# Patient Record
Sex: MALE | Race: WHITE
[De-identification: names, ages, dates, MRNs, and addresses within clinical notes are randomized per-mention and may not be internally consistent; named-entity substitution may affect disease eponyms.]

---

## 2017-03-27 ENCOUNTER — HOSPITAL ENCOUNTER (INPATIENT)
Dept: HOSPITAL 17 - NEPA | Age: 57
LOS: 5 days | Discharge: HOME HEALTH | DRG: 175 | End: 2017-04-01
Payer: COMMERCIAL

## 2017-03-27 VITALS
SYSTOLIC BLOOD PRESSURE: 105 MMHG | RESPIRATION RATE: 20 BRPM | DIASTOLIC BLOOD PRESSURE: 73 MMHG | OXYGEN SATURATION: 95 % | HEART RATE: 103 BPM

## 2017-03-27 VITALS
HEART RATE: 110 BPM | DIASTOLIC BLOOD PRESSURE: 100 MMHG | OXYGEN SATURATION: 94 % | SYSTOLIC BLOOD PRESSURE: 132 MMHG | RESPIRATION RATE: 28 BRPM

## 2017-03-27 VITALS
OXYGEN SATURATION: 85 % | TEMPERATURE: 98.2 F | RESPIRATION RATE: 22 BRPM | SYSTOLIC BLOOD PRESSURE: 116 MMHG | HEART RATE: 90 BPM | DIASTOLIC BLOOD PRESSURE: 54 MMHG

## 2017-03-27 VITALS
SYSTOLIC BLOOD PRESSURE: 133 MMHG | RESPIRATION RATE: 20 BRPM | HEART RATE: 98 BPM | OXYGEN SATURATION: 95 % | DIASTOLIC BLOOD PRESSURE: 65 MMHG

## 2017-03-27 VITALS
DIASTOLIC BLOOD PRESSURE: 78 MMHG | OXYGEN SATURATION: 95 % | HEART RATE: 98 BPM | RESPIRATION RATE: 20 BRPM | SYSTOLIC BLOOD PRESSURE: 118 MMHG

## 2017-03-27 VITALS — OXYGEN SATURATION: 94 %

## 2017-03-27 VITALS — BODY MASS INDEX: 41.75 KG/M2 | WEIGHT: 315 LBS | HEIGHT: 73 IN

## 2017-03-27 VITALS
OXYGEN SATURATION: 96 % | HEART RATE: 94 BPM | SYSTOLIC BLOOD PRESSURE: 118 MMHG | TEMPERATURE: 97.8 F | DIASTOLIC BLOOD PRESSURE: 67 MMHG | RESPIRATION RATE: 20 BRPM

## 2017-03-27 VITALS — OXYGEN SATURATION: 97 %

## 2017-03-27 VITALS — HEART RATE: 90 BPM

## 2017-03-27 DIAGNOSIS — Z23: ICD-10-CM

## 2017-03-27 DIAGNOSIS — R51: ICD-10-CM

## 2017-03-27 DIAGNOSIS — J96.01: ICD-10-CM

## 2017-03-27 DIAGNOSIS — E66.01: ICD-10-CM

## 2017-03-27 DIAGNOSIS — Z82.49: ICD-10-CM

## 2017-03-27 DIAGNOSIS — E83.51: ICD-10-CM

## 2017-03-27 DIAGNOSIS — I26.99: Primary | ICD-10-CM

## 2017-03-27 DIAGNOSIS — H91.90: ICD-10-CM

## 2017-03-27 DIAGNOSIS — I82.432: ICD-10-CM

## 2017-03-27 LAB
ALP SERPL-CCNC: 98 U/L (ref 45–117)
ALT SERPL-CCNC: 36 U/L (ref 12–78)
ANION GAP SERPL CALC-SCNC: 12 MEQ/L (ref 5–15)
APTT BLD: 28.1 SEC (ref 24.3–30.1)
APTT BLD: 29.6 SEC (ref 24.3–30.1)
APTT BLD: 31 SEC (ref 24.3–30.1)
AST SERPL-CCNC: 35 U/L (ref 15–37)
BASOPHILS # BLD AUTO: 0.1 TH/MM3 (ref 0–0.2)
BASOPHILS # BLD AUTO: 0.2 TH/MM3 (ref 0–0.2)
BASOPHILS NFR BLD: 0.7 % (ref 0–2)
BASOPHILS NFR BLD: 1 % (ref 0–2)
BILIRUB SERPL-MCNC: 0.4 MG/DL (ref 0.2–1)
BUN SERPL-MCNC: 15 MG/DL (ref 7–18)
CHLORIDE SERPL-SCNC: 104 MEQ/L (ref 98–107)
COLOR UR: YELLOW
COMMENT (UR): (no result)
CULTURE IF INDICATED: (no result)
EOSINOPHIL # BLD: 0.1 TH/MM3 (ref 0–0.4)
EOSINOPHIL # BLD: 0.1 TH/MM3 (ref 0–0.4)
EOSINOPHIL NFR BLD: 0.9 % (ref 0–4)
EOSINOPHIL NFR BLD: 0.9 % (ref 0–4)
ERYTHROCYTE [DISTWIDTH] IN BLOOD BY AUTOMATED COUNT: 14.4 % (ref 11.6–17.2)
ERYTHROCYTE [DISTWIDTH] IN BLOOD BY AUTOMATED COUNT: 14.5 % (ref 11.6–17.2)
ERYTHROCYTE [DISTWIDTH] IN BLOOD BY AUTOMATED COUNT: 14.9 % (ref 11.6–17.2)
FIBRINOGEN PPP-MCNC: 427 MG/DL (ref 227–377)
FIBRINOGEN PPP-MCNC: 428 MG/DL (ref 227–377)
GFR SERPLBLD BASED ON 1.73 SQ M-ARVRAT: 61 ML/MIN (ref 89–?)
GLUCOSE UR STRIP-MCNC: (no result) MG/DL
HCO3 BLD-SCNC: 20.2 MEQ/L (ref 21–32)
HCT VFR BLD CALC: 41.4 % (ref 39–51)
HCT VFR BLD CALC: 41.5 % (ref 39–51)
HCT VFR BLD CALC: 43.9 % (ref 39–51)
HEMO FLAGS: (no result)
HEMO FLAGS: (no result)
HGB UR QL STRIP: (no result)
INR PPP: 1.1 RATIO
KETONES UR STRIP-MCNC: (no result) MG/DL
LYMPHOCYTES # BLD AUTO: 2.4 TH/MM3 (ref 1–4.8)
LYMPHOCYTES # BLD AUTO: 3.6 TH/MM3 (ref 1–4.8)
LYMPHOCYTES NFR BLD AUTO: 15.3 % (ref 9–44)
LYMPHOCYTES NFR BLD AUTO: 21.8 % (ref 9–44)
MCH RBC QN AUTO: 28.5 PG (ref 27–34)
MCH RBC QN AUTO: 28.9 PG (ref 27–34)
MCH RBC QN AUTO: 28.9 PG (ref 27–34)
MCHC RBC AUTO-ENTMCNC: 31.9 % (ref 32–36)
MCHC RBC AUTO-ENTMCNC: 32.7 % (ref 32–36)
MCHC RBC AUTO-ENTMCNC: 32.8 % (ref 32–36)
MCV RBC AUTO: 88.1 FL (ref 80–100)
MCV RBC AUTO: 88.2 FL (ref 80–100)
MCV RBC AUTO: 89.2 FL (ref 80–100)
MONOCYTES NFR BLD: 8.7 % (ref 0–8)
MONOCYTES NFR BLD: 9.7 % (ref 0–8)
MUCOUS THREADS #/AREA URNS LPF: (no result) /LPF
NEUTROPHILS # BLD AUTO: 10.9 TH/MM3 (ref 1.8–7.7)
NEUTROPHILS # BLD AUTO: 11.9 TH/MM3 (ref 1.8–7.7)
NEUTROPHILS NFR BLD AUTO: 66.6 % (ref 16–70)
NEUTROPHILS NFR BLD AUTO: 74.4 % (ref 16–70)
NITRITE UR QL STRIP: (no result)
PLATELET # BLD: 320 TH/MM3 (ref 150–450)
PLATELET # BLD: 339 TH/MM3 (ref 150–450)
PLATELET # BLD: 348 TH/MM3 (ref 150–450)
POTASSIUM SERPL-SCNC: 4.2 MEQ/L (ref 3.5–5.1)
PROTHROMBIN TIME: 12.5 SEC (ref 9.8–11.6)
RBC # BLD AUTO: 4.65 MIL/MM3 (ref 4.5–5.9)
RBC # BLD AUTO: 4.7 MIL/MM3 (ref 4.5–5.9)
RBC # BLD AUTO: 4.97 MIL/MM3 (ref 4.5–5.9)
REVIEW FLAG: (no result)
SODIUM SERPL-SCNC: 136 MEQ/L (ref 136–145)
SP GR UR STRIP: 1.03 (ref 1–1.03)
SQUAMOUS #/AREA URNS HPF: <1 /HPF (ref 0–5)
WBC # BLD AUTO: 15.5 TH/MM3 (ref 4–11)
WBC # BLD AUTO: 16 TH/MM3 (ref 4–11)
WBC # BLD AUTO: 16.4 TH/MM3 (ref 4–11)

## 2017-03-27 PROCEDURE — 96375 TX/PRO/DX INJ NEW DRUG ADDON: CPT

## 2017-03-27 PROCEDURE — 83735 ASSAY OF MAGNESIUM: CPT

## 2017-03-27 PROCEDURE — 81001 URINALYSIS AUTO W/SCOPE: CPT

## 2017-03-27 PROCEDURE — 84484 ASSAY OF TROPONIN QUANT: CPT

## 2017-03-27 PROCEDURE — 90732 PPSV23 VACC 2 YRS+ SUBQ/IM: CPT

## 2017-03-27 PROCEDURE — 81241 F5 GENE: CPT

## 2017-03-27 PROCEDURE — 93306 TTE W/DOPPLER COMPLETE: CPT

## 2017-03-27 PROCEDURE — 94620: CPT

## 2017-03-27 PROCEDURE — 76937 US GUIDE VASCULAR ACCESS: CPT

## 2017-03-27 PROCEDURE — 70450 CT HEAD/BRAIN W/O DYE: CPT

## 2017-03-27 PROCEDURE — 85597 PHOSPHOLIPID PLTLT NEUTRALIZ: CPT

## 2017-03-27 PROCEDURE — 3E04317 INTRODUCTION OF OTHER THROMBOLYTIC INTO CENTRAL VEIN, PERCUTANEOUS APPROACH: ICD-10-PCS | Performed by: RADIOLOGY

## 2017-03-27 PROCEDURE — 87641 MR-STAPH DNA AMP PROBE: CPT

## 2017-03-27 PROCEDURE — 37212 THROMBOLYTIC VENOUS THERAPY: CPT

## 2017-03-27 PROCEDURE — 87086 URINE CULTURE/COLONY COUNT: CPT

## 2017-03-27 PROCEDURE — 84100 ASSAY OF PHOSPHORUS: CPT

## 2017-03-27 PROCEDURE — 85598 HEXAGNAL PHOSPH PLTLT NEUTRL: CPT

## 2017-03-27 PROCEDURE — 85025 COMPLETE CBC W/AUTO DIFF WBC: CPT

## 2017-03-27 PROCEDURE — 93970 EXTREMITY STUDY: CPT

## 2017-03-27 PROCEDURE — 36556 INSERT NON-TUNNEL CV CATH: CPT

## 2017-03-27 PROCEDURE — 05HM33Z INSERTION OF INFUSION DEVICE INTO RIGHT INTERNAL JUGULAR VEIN, PERCUTANEOUS APPROACH: ICD-10-PCS | Performed by: RADIOLOGY

## 2017-03-27 PROCEDURE — 93005 ELECTROCARDIOGRAM TRACING: CPT

## 2017-03-27 PROCEDURE — 85730 THROMBOPLASTIN TIME PARTIAL: CPT

## 2017-03-27 PROCEDURE — 83880 ASSAY OF NATRIURETIC PEPTIDE: CPT

## 2017-03-27 PROCEDURE — 85670 THROMBIN TIME PLASMA: CPT

## 2017-03-27 PROCEDURE — B543ZZA ULTRASONOGRAPHY OF RIGHT JUGULAR VEINS, GUIDANCE: ICD-10-PCS | Performed by: RADIOLOGY

## 2017-03-27 PROCEDURE — 85027 COMPLETE CBC AUTOMATED: CPT

## 2017-03-27 PROCEDURE — 80048 BASIC METABOLIC PNL TOTAL CA: CPT

## 2017-03-27 PROCEDURE — 85610 PROTHROMBIN TIME: CPT

## 2017-03-27 PROCEDURE — 83090 ASSAY OF HOMOCYSTEINE: CPT

## 2017-03-27 PROCEDURE — C9113 INJ PANTOPRAZOLE SODIUM, VIA: HCPCS

## 2017-03-27 PROCEDURE — 85613 RUSSELL VIPER VENOM DILUTED: CPT

## 2017-03-27 PROCEDURE — 85303 CLOT INHIBIT PROT C ACTIVITY: CPT

## 2017-03-27 PROCEDURE — 85300 ANTITHROMBIN III ACTIVITY: CPT

## 2017-03-27 PROCEDURE — 71275 CT ANGIOGRAPHY CHEST: CPT

## 2017-03-27 PROCEDURE — 82948 REAGENT STRIP/BLOOD GLUCOSE: CPT

## 2017-03-27 PROCEDURE — 85306 CLOT INHIBIT PROT S FREE: CPT

## 2017-03-27 PROCEDURE — 80053 COMPREHEN METABOLIC PANEL: CPT

## 2017-03-27 PROCEDURE — 71010: CPT

## 2017-03-27 PROCEDURE — 94640 AIRWAY INHALATION TREATMENT: CPT

## 2017-03-27 PROCEDURE — 96374 THER/PROPH/DIAG INJ IV PUSH: CPT

## 2017-03-27 PROCEDURE — 85384 FIBRINOGEN ACTIVITY: CPT

## 2017-03-27 RX ADMIN — PANTOPRAZOLE SODIUM SCH MG: 40 INJECTION, POWDER, FOR SOLUTION INTRAVENOUS at 16:00

## 2017-03-27 RX ADMIN — IPRATROPIUM BROMIDE AND ALBUTEROL SULFATE SCH AMPULE: .5; 3 SOLUTION RESPIRATORY (INHALATION) at 18:09

## 2017-03-27 RX ADMIN — IPRATROPIUM BROMIDE AND ALBUTEROL SULFATE SCH AMPULE: .5; 3 SOLUTION RESPIRATORY (INHALATION) at 19:40

## 2017-03-27 RX ADMIN — HUMAN INSULIN SCH: 100 INJECTION, SOLUTION SUBCUTANEOUS at 23:00

## 2017-03-27 RX ADMIN — IPRATROPIUM BROMIDE AND ALBUTEROL SULFATE SCH AMPULE: .5; 3 SOLUTION RESPIRATORY (INHALATION) at 23:41

## 2017-03-27 RX ADMIN — PHENYTOIN SODIUM SCH MLS/HR: 50 INJECTION INTRAMUSCULAR; INTRAVENOUS at 19:07

## 2017-03-27 NOTE — MH
cc:

TABATHA TAM M.D.

****

 

DATE OF ADMISSION

3/27/2017

 

DATE OF BIRTH

December 15, 1960

 

HISTORY OF THE PRESENT ILLNESS

The patient is a 56-year-old male with past medical history of morbid obesity

who presented to [facility] ED with a one week history of

left-sided chest pain that radiates into his flank associated with shortness of

breath for 4 days.  Last week he was diagnosed with pneumonia by his PCP and

started on Levaquin which he has been taking for the past 5 days. He denies any

symptoms of edema of lower extremity or PND.  Also the patient denies any

nausea, vomiting or abdominal pain.  On arrival to the ER he was tachycardiac

and hypoxemic with O2 saturation of 85%.  The patient was placed on 5 liters

nasal cannula with saturation improved to 95%.  Chest x-ray in the ER showed

cardiomegaly with subsegmental atelectasis in the left lung base and small left

pleural effusion.  Due to his respiratory distress CT angiogram of the chest

was obtained which showed massive bilateral PE, right greater than left with

subpleural atelectatic changes in the left lower lobe.

 

His laboratory data significant for Troponin of 0.56 and elevated BNP of 421.

In the ER the patient was given heparin bolus and just started on heparin drip.

Case discussed with Dr. Parrish  from interventional radiology, and plan to

proceed with t-PA infusion at 2 mg an hour and heparin drip 500 units per hour.

The patient denies prior history of pulmonary embolism or DVT.  However, he

states that his brother had a DVT in the past.

 

PAST MEDICAL HISTORY

Significant for:

Morbid obesity.

 

The patient denies any history of hypertension, hyperlipidemia, diabetes

mellitus.

 

PAST SURGICAL HISTORY

Unremarkable.

 

ALLERGIES

NO KNOWN DRUG ALLERGIES.

 

MEDICATIONS

At home, none.

 

SOCIAL HISTORY

Remote history of tobacco and EtOH use.

 

FAMILY HISTORY

A brother had DVT in the past.

 

REVIEW OF SYSTEMS

As per HPI.  The rest of the review of systems unremarkable.

 

PHYSICAL EXAMINATION

GENERAL: A 56-year-old male lying in bed in mild respiratory distress.

VITAL SIGNS: Temperature 98.3, pulse of 103, blood pressure 105/73, saturation

of 95% on 5 liters oxygen.

HEENT:  Atraumatic, normocephalic. Pupils equal, round and reactive to light

and accommodation. Extraocular muscles intact.  Conjunctivae pink.  Nonicteric

sclerae.  Oral mucosa within normal.

NECK: Supple.  No JVD, adenopathy or thyromegaly.  Trachea midline.

CARDIOVASCULAR: Tachycardiac. Normal S1-S2.  No murmurs, rubs or gallops noted.

 

LUNGS: Pulmonary exam bilateral equal air entry.  No crackles.  ABDOMEN: Soft,

obese, nontender, no distension.  Positive bowel sounds.

EXTREMITIES: No cyanosis, clubbing. Trace edema.

NEUROLOGIC:  No focal sensory deficit.  Awake, alert, oriented x3.

 

LABORATORY DATA

WBC 15.5, hemoglobin 13.6, hematocrit 41, platelet count 339.

 

Sodium 136, potassium 4.2, chloride 104, CO2 of 20, BUN 15, creatinine 1.23,

glucose 120.

 

Troponin 0.56.

 

.

 

INR 1.1, PT 12.5, PTT 28.1.

 

RADIOGRAPHY

CT angiogram of the chest showed massive bilateral PE, right greater than left

and sub total atelectatic changes in the left lower lobe.

 

IMPRESSION

1. Acute hypoxemic respiratory failure.

2. Massive bilateral pulmonary embolism.

3. Morbid obesity.

4. Leukocytosis.

 

RECOMMENDATIONS

1. Monitor neuro status and avoid any sedatives.

2. Continue with oxygen and maintain sats above 92%.

3. Bronchodilators in the form of DuoNeb q. 4q. Plus q.2h as needed for

   shortness of breath.

4. Case discussed with Dr. Parrish from interventional radiology. We will

   proceed with t-PA infusion at 2 mg an hour in addition to heparin drip 500

   units an hour.

5. Given the massive bilateral PE with a large clot burden,  hypoxemia,

   elevated troponin and BNP and likely right heart strain, the patient is a

   candidate for thrombolysis.

6. Monitor heart rate and blood pressure closely and maintain MAP greater than

   65 mmHg.

7. Monitor troponins and we will obtain 2-D echo to evaluate LV function and

   check for right-sided heart strain.

8. Monitor renal function Is and Os and electrolyte replacement as needed.

9. Place on Protonix 40 mg IV daily for GI prophylaxis.

10. IV fluids in the form of NS at 70 ml an hour.

11. Monitor for signs of infections which include fever and WBC.  We will check

   urinalysis with culture if indicated.  Will panculture spikes in fever.

12. Sliding scale insulin with Accu-Cheks if needed for glycemic control.

13. Monitor CBC and coags as the patient will be on t-PA and heparin.

     Check hypercoagulable profile. Hematology evaluation

14. GI prophylaxis with Protonix 40 mg daily and DVT prophylaxis.  The patient

   will be will be receiving t-PA infusion for PE.

     Check Doppler US LE ro/o DVT.

 

Critical care time 35 minutes.

 

The case discussed with ED physician and Dr. Parrish from interventional

radiology.

 

 

 

                               __________________________________

                           MD ARCHANA Cleveland/DEMETRIS

D:  3/27/2017/4:07 PM

T:  3/27/2017/4:45 PM

Visit #:  [id number]

Job #:  [id number]

MTDD

## 2017-03-27 NOTE — RADRPT
EXAM DATE/TIME:  03/27/2017 17:16 

 

[facility] COMPARISON:     

CHEST SINGLE AP, March 27, 2017, 13:06.

 

                     

INDICATIONS :     

Central line placement.

                     

 

MEDICAL HISTORY :     

None.          

 

SURGICAL HISTORY :     

None.   

 

ENCOUNTER:     

Initial                                        

 

ACUITY:     

1 day      

 

PAIN SCORE:     

Non-responsive.

 

LOCATION:     

Bilateral chest 

 

FINDINGS:     

A single view of the chest demonstrates the lungs to be symmetrically aerated without evidence of mas
s, infiltrate or effusion.  Please note the lung bases were not included on this exam as it was perfo
rmed for central line position confirmation.  Osseous structures are intact. Right IJ central venous 
catheter with the tip projecting over the central venous system

 

CONCLUSION:     

Right IJ central venous catheter with the tip projecting over the central venous system.

 

 

 

 Urban Parrish MD on March 27, 2017 at 17:40           

Board Certified Radiologist.

 This report was verified electronically.

## 2017-03-27 NOTE — PD
HPI


Chief Complaint:  Respiratory Distress


Time Seen by Provider:  13:03


Travel History


International Travel<30 days:  No


Contact w/Intl Traveler<30days:  No


Traveled to known affect area:  No





History of Present Illness


HPI


This is a 56-year-old male with a history of morbid obesity who presents the 

emergency department with 1 week of left-sided chest pain that goes into his 

flank, worse with deep breaths and worse with coughing, associated with 

shortness of breath particularly on exertion.  He's had some nonproductive 

cough and some rhinorrhea.  On Wednesday he was diagnosed with pneumonia on 

chest x-ray by his primary care physician and started on Levaquin which he's 

been taking for 5 days.  He was supposed to go to an outpatient CT scan today 

but because his shortness of breath has been worsening his primary care 

physician told him to come to the emergency department.  He's never had 

symptoms like this before.  He was recently admitted to the hospital several 

weeks ago for edema.  He was given diuresis and found to be hypocalcemic.  

Ultimately they didn't find a diagnosis of congestive heart failure.





PFSH


Past Medical History


Medical other:  Yes (OBESITY)


Tetanus Vaccination:  Unknown


Influenza Vaccination:  No





Past Surgical History


Surgical History:  No Previous Surgery





Social History


Alcohol Use:  No


Tobacco Use:  No


Substance Use:  No





Allergies-Medications


(Allergen,Severity, Reaction):  


Coded Allergies:  


     No Known Allergies (Unverified , 3/27/17)


Reported Meds & Prescriptions








Reported Meds & Active Scripts


Active


Reported


Levaquin (Levofloxacin) 750 Mg Tab 750 Mg PO DAILY 5 Days


Contrave (Naltrexone-Bupropion) 8-90 Mg Tab 2 Tab PO BID











Review of Systems


Except as stated in HPI:  all other systems reviewed are Neg





Physical Exam


Narrative


GENERAL:Morbidly obese


SKIN: Warm and dry.


HEAD: Atraumatic. Normocephalic. 


EYES: Pupils equal and round.  No injection or drainage. 


ENT:  Moist mucous membranes


NECK: Trachea midline. 


CARDIOVASCULAR: Regular rate and rhythm.  No murmur appreciated.


RESPIRATORY: Clear to auscultation. Breath sounds equal bilaterally. Exam 

limited by habius


GASTROINTESTINAL: Abdomen soft, non-tender, nondistended. 


MUSCULOSKELETAL: No obvious deformities. 


NEUROLOGICAL: Awake and alert. No obvious cranial nerve deficits.   Moving all 

extremities


PSYCHIATRIC: Appropriate mood and affect; insight and judgment normal.





Data


Data


Last Documented VS





Vital Signs








  Date Time  Temp Pulse Resp B/P Pulse Ox O2 Delivery O2 Flow Rate FiO2


 


3/27/17 15:00  103 20 105/73 95 Nasal Cannula 5 


 


3/27/17 12:43 98.2       








Orders





 Complete Blood Count With Diff (3/27/17 13:03)


Comprehensive Metabolic Panel (3/27/17 13:03)


B-Type Natriuretic Peptide (3/27/17 13:03)


Troponin I (3/27/17 13:03)


Iv Access Insert/Monitor (3/27/17 13:03)


Electrocardiogram (3/27/17 13:03)


Ecg Monitoring (3/27/17 13:03)


Oximetry (3/27/17 13:03)


Oxygen Administration (3/27/17 13:03)


Chest, Single Ap (3/27/17 13:03)


Ct Pulmonary Angiogram (3/27/17 13:03)


Iohexol 350 Inj (Omnipaque 350 Inj) (3/27/17 13:59)


Heparin Infusion [place].Q1H (3/27/17 14:07)


Heparin Inj (Heparin Inj) (3/27/17 14:15)


Heparin Inj (Heparin Inj) (3/27/17 20:15)


Heparin Inj (Heparin Inj) (3/27/17 20:15)


Heparin-D5w Inj (Heparin-D5w Inj) (3/27/17 14:15)


Act Partial Throm Time (Ptt) (3/27/17 14:07)


Prothrombin Time / Inr (Pt) (3/27/17 14:07)


Cbc No Diff, Includes Plts (3/27/17 14:07)


Cbc No Diff, Includes Plts (3/30/17 06:00)


Act Partial Throm Time (Ptt) (3/27/17 21:07)


Occult Blood (Hemoccult) Stool (3/27/17 14:07)


Admit Order (Ed Use Only) (3/27/17 15:27)





Labs








 Laboratory Tests








Test 3/27/17 3/27/17





 13:30 14:20


 


White Blood Count 16.0 TH/MM3 15.5 TH/MM3


 


Red Blood Count 4.97 MIL/MM3 4.70 MIL/MM3


 


Hemoglobin 14.4 GM/DL 13.6 GM/DL


 


Hematocrit 43.9 % 41.4 %


 


Mean Corpuscular Volume 88.2 FL 88.1 FL


 


Mean Corpuscular Hemoglobin 28.9 PG 28.9 PG


 


Mean Corpuscular Hemoglobin 32.7 % 32.8 %





Concent  


 


Red Cell Distribution Width 14.5 % 14.9 %


 


Platelet Count 348 TH/MM3 339 TH/MM3


 


Mean Platelet Volume 7.6 FL 7.6 FL


 


Neutrophils (%) (Auto) 74.4 % 


 


Lymphocytes (%) (Auto) 15.3 % 


 


Monocytes (%) (Auto) 8.7 % 


 


Eosinophils (%) (Auto) 0.9 % 


 


Basophils (%) (Auto) 0.7 % 


 


Neutrophils # (Auto) 11.9 TH/MM3 


 


Lymphocytes # (Auto) 2.4 TH/MM3 


 


Monocytes # (Auto) 1.4 TH/MM3 


 


Eosinophils # (Auto) 0.1 TH/MM3 


 


Basophils # (Auto) 0.1 TH/MM3 


 


CBC Comment DIFF FINAL  


 


Differential Comment   


 


Sodium Level 136 MEQ/L 


 


Potassium Level 4.2 MEQ/L 


 


Chloride Level 104 MEQ/L 


 


Carbon Dioxide Level 20.2 MEQ/L 


 


Anion Gap 12 MEQ/L 


 


Blood Urea Nitrogen 15 MG/DL 


 


Creatinine 1.23 MG/DL 


 


Estimat Glomerular Filtration 61 ML/MIN 





Rate  


 


Random Glucose 120 MG/DL 


 


Calcium Level 9.0 MG/DL 


 


Total Bilirubin 0.4 MG/DL 


 


Aspartate Amino Transf 35 U/L 





(AST/SGOT)  


 


Alanine Aminotransferase 36 U/L 





(ALT/SGPT)  


 


Alkaline Phosphatase 98 U/L 


 


Troponin I 0.56 NG/ML 


 


B-Type Natriuretic Peptide 421 PG/ML 


 


Total Protein 8.3 GM/DL 


 


Albumin 2.9 GM/DL 


 


Prothrombin Time  12.5 SEC


 


Prothromb Time International  1.1 RATIO





Ratio  


 


Activated Partial  28.1 SEC





Thromboplast Time  














MDM


Medical Decision Making


Medical Screen Exam Complete:  Yes


Emergency Medical Condition:  Yes


Interpretation(s)


Afebrile tachycardia, normotensive, hypoxic


Leukocytosis


Troponin is 0.56


BNP is 421





Last 24 hours Impressions








Chest X-Ray 3/27/17 1303 Signed





Impressions: 





 Service Date/Time:  Monday, March 27, 2017 13:06 - CONCLUSION:  1. 

Cardiomegaly 





 2. There is subsegmental atelectasis in the left base. Small left effusion     





 Keegan Carlton MD 


 


CT Angiography 3/27/17 1305 Signed





Impressions: 





 Service Date/Time:  Monday, March 27, 2017 13:46 - CONCLUSION:  1. Massive 





 bilateral PE, right greater than left. 2. Subpleural atelectatic changes in 

the 





 left lower lobe and atelectatic changes paralleling the left major fissure, 





 likely related to the large clot burden in the pulmonary artery. 3. Results 

were 





 called to Dr. Watson in the ED at the time of this dictation     Urban Parrish MD 


 


Lower Extremity Ultrasound 3/27/17 0000 Signed





Impressions: 





 Service Date/Time:  Monday, March 27, 2017 15:42 - CONCLUSION: Nonocclusive 





 thrombus left popliteal vein     Ever Leal MD  FACR








Differential Diagnosis


Pulmonary embolism, pneumonia, acute coronary syndrome, congestive heart failure


Narrative Course


This is a 56-year-old male who presents to the emergency department with chest 

discomfort and shortness of breath.  He is tachycardic and hypoxic on arrival.  

He is placed on a monitor and an IV was established.  Labs are obtained which 

demonstrated troponin of 0.56 and a BNP of 421.  CT demonstrates massive 

pulmonary emboli.  I spoke to interventional radiology and the intensivist.  

Plan for central line placement with TPA infusion.  Patient will be admitted.


Critical Care Narrative


Aggregate critical care time was 45 minutes. Time to perform other separately 

billable procedures was not included in the critical care time. My time did not 

include minutes spent treating any other patients simultaneously or on 

activities that did not directly contribute to the patient's treatment.  





The services I provided to this patient were to treat and/or prevent clinically 

significant deterioration that could result in: Disability, death





I provided critical care services requiring my management, as noted below:


Chart data review, documentation time, medication orders and management, vital 

sign assessments/reviewing monitor data, ordering and reviewing lab tests, 

ordering and interpreting/reviewing x-rays and diagnostic studies, care of the 

patient and discussion of the patient with the admitting physicians.





Physician Communication


Physician Communication


Discussed with Dr. Parrish and Dr. Villarreal





Diagnosis





 Primary Impression:  


 Pulmonary embolus


 Qualified Code:  I26.09 - Other acute pulmonary embolism with acute cor 

pulmonale





Admitting Information


Admitting Physician Requests:  Admit








Lucinda Watson MD Mar 27, 2017 13:30

## 2017-03-27 NOTE — RADRPT
EXAM DATE/TIME:  03/27/2017 13:06 

 

[facility] COMPARISON:     

No previous studies available for comparison.

 

                     

INDICATIONS :     

Breathing difficulty.

                     

 

MEDICAL HISTORY :            

Pneumonia.   

 

SURGICAL HISTORY :     

None.   

 

ENCOUNTER:     

Initial                                        

 

ACUITY:     

3 days      

 

PAIN SCORE:     

0/10

 

LOCATION:     

Bilateral chest 

 

FINDINGS:     

The cardiac silhouette is enlarged in transverse diameter. There is subsegmental atelectasis in the l
eft base. A small left sided effusion is present. The right lung is free of acute parenchymal opacity
.

 

CONCLUSION:     

1. Cardiomegaly

2. There is subsegmental atelectasis in the left base. Small left effusion

 

 

 

 Keegan Carlton MD on March 27, 2017 at 13:27           

Board Certified Radiologist.

 This report was verified electronically.

## 2017-03-27 NOTE — PD.RAD
Post Procedure Progress Note


Pre Procedure Diagnosis:  


(1) Pulmonary embolus


Post Procedure Diagnosis:  


(1) Pulmonary embolus


Procedure Date:


Mar 27, 2017


Supervising Radiologist:


Urban Parrish


Proceduralist/Assist:  Kalpesh Burns RT(R), RT Johnny(R)(CV)


Anesthesia:  Local


Plan of Activity


Patient to Unit:  Other (ED)


Patient Condition:  Fair


See PACS Report for procedural detail/treatment





Central Venous Access Device


Procedure 1


Right


Internal Jugular


Central Line


Placement


triple lumen


Hungarian:  7


PICC Line Length (cm):  16


Findings:


TPA initiated through central port of CVL at 2 mg/hr








Urban Parrish MD Mar 27, 2017 17:40

## 2017-03-27 NOTE — RADRPT
EXAM DATE/TIME:  03/27/2017 13:46 

 

[facility] COMPARISON:     

No previous studies available for comparison.

 

 

INDICATIONS :     

Left sided chest pain and shortness of breath for five days.

                      

 

IV CONTRAST:     

74 cc Omnipaque 350 (iohexol) IV 

 

 

RADIATION DOSE:     

27.65 CTDIvol (mGy) 

 

 

MEDICAL HISTORY :     

None  

 

SURGICAL HISTORY :      

None. 

 

ENCOUNTER:      

Initial

 

ACUITY:      

4 - 6 days

 

PAIN SCALE:      

5/10

 

LOCATION:       

Left chest 

 

TECHNIQUE:     

Volumetric scanning of the chest was performed using a pulmonary embolism protocol MIP images were re
constructed.  Using automated exposure control and adjustment of the mA and/or kV according to patien
t size, radiation dose was kept as low as reasonably achievable to obtain optimal diagnostic quality 
images. 

 

FINDINGS:     

 

PULMONARY ARTERIES: 

Massive bilateral pulmonary embolus, right greater than left.

 

LUNGS:     

Some pleural based consolidation in the left lower lobe. Additional area of atelectasis paralleling t
he major fissure in the superior segment of the left lower.

 

PLEURAE:     

Pleural thickening in the left base likely representing atelectasis mentioned above.

 

MEDIASTINUM:     

There is good visualization of the great vessels of the middle mediastinum.  No evidence of mediastin
al or hilar adenopathy/mass.

 

MUSCULOSKELETAL:     

Within normal limits for patient age.

 

MISCELLANEOUS:     

The visualized upper abdominal organs demonstrate no acute abnormality.

 

CONCLUSION:     

1. Massive bilateral PE, right greater than left.

2. Subpleural atelectatic changes in the left lower lobe and atelectatic changes paralleling the left
 major fissure, likely related to the large clot burden in the pulmonary artery.

3. Results were called to Dr. Watson in the ED at the time of this dictation

 

 

 

 Urban Parrish MD on March 27, 2017 at 14:43           

Board Certified Radiologist.

 This report was verified electronically.

## 2017-03-27 NOTE — MB
cc:

WOLF GARZA M.D., G. FREDERICK M.D. ISKANDAR, ALAA M.D.

****

 

 

DATE OF CONSULTATION

3/27/2017

 

REASON FOR CONSULTATION

Consult requested by Dr. Villarreal for evaluation of massive bilateral pulmonary

embolism.

 

HISTORY OF PRESENT ILLNESS

Steve is a pleasant 56-year-old male.  He has a history of morbid obesity and 
hard of hearing.  

The patient about a week ago had developed shortness of breath.  He was treated

with antibiotics by his primary physician, Dr. Cast. His symptoms did not

improve.  The patient lives in [place]. His primary physician advised him to

come to the emergency room at [facility] due to progressive shortness of breath.



In the emergency room the patient was found to be hypoxic. His O2 saturation was

85%.  He was placed on oxygen and his O2 saturation improved to 94%.  For

further evaluation of the hypoxia a CT angiogram of the chest was done which

shows massive bilateral pulmonary embolism, right greater than left.  He has

subpleural atelectatic changes in the left lower lobe,  atelectatic changes

paralleling the left major fissure likely related to the large clot burden in

the pulmonary artery.  The patient has been admitted by the intensivist, Dr. Villarreal.  Dr. Villarreal has discussed the case with interventional radiologist,

Dr. Parrish.  TPA was recommended due to massive burden of the clot and right

heart strain.  I have been asked to see the patient for further evaluation.

 

The patient is seen in the Intensive Care Unit with his daughter and son at the

bedside.  The patient is short of breath and has  pleuritic chest pains.  He

is being closely monitored in the Intensive Care Unit.  He denies any swelling

of both lower legs.  He never had a blood clot before.  He has a family history

of DVT.  One of his brother has DVT in his legs.  The rest of the review of

systems is negative.

 

PAST MEDICAL HISTORY

Morbid obesity.

Hard of hearing.

 

PAST SURGICAL HISTORY

None.

 

ALLERGIES

None.

 

MEDICATIONS

1. Antibiotic.

2. Contrave to lose weight for the past six weeks.

 

FAMILY HISTORY

Father  from lung cancer.  Mother is alive with lung cancer.  The patient

has two brothers, one has had DVT of lower legs.  The patient does not have any

sisters.  He has one son and two daughters and one step daughter, all are alive

and well.

 

SOCIAL HISTORY

The patient is  and lives with his wife.  He does not smoke cigarettes,

does not drink alcohol. He works, but he does not do any physical work, most of

the time he is just sitting at the desk.

 

PHYSICAL EXAMINATION

GENERAL: This is a morbidly obese white male in mild respiratory distress.

VITAL SIGNS: Heart rate is 98, blood pressure 118/78, O2 saturation 98%.

HEENT:  PERRLA, EOMI, anicteric.  No oral lesions are noted.  NECK: Supple

LYMPHATICS: There is no cervical, supraclavicular, axillary lymphadenopathy

noted.

LUNGS: Clear.  No wheezing, rhonchi or rales.

CARDIOVASCULAR: Heart is regular rate and rhythm.

ABDOMEN: Unable to feel for the liver and spleen or any masses due to morbid

obesity.

EXTREMITIES: No pedal edema.

NEUROLOGIC:  Awake, alert, oriented times three.

SKIN: No significant lesions noted.

 

ASSESSMENT

1. Massive bilateral pulmonary embolism most likely due to sedentary lifestyle

     due to morbid obesity.

2. Morbid obesity.

3. Family history of DVT of the leg in one of his brother.

4. Hard of hearing, wears hearing aids.

 

PLAN

I have reviewed his available records and I have discussed with the patient,

daughter and son at the bedside regarding  the pulmonary embolism.

The patient has a very high clot burden.  He was started on TPA and then he

will be on heparin.  We discussed the oral anticoagulant such as Coumadin or

new oral anticoagulant such as the Xarelto or Eliquis.  



The patient does not have any swelling of lower legs, but I will obtain Doppler 

ultrasound of both lower legs to evaluate for any DVTs.  The patient has morbid 
obesity.  

He is sedentary due to the overweight. Although he works, but there is no 
physical

work. 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 He has family history of DVT

as one of his brothers has DVT of the lower legs.  Further details of that are

not available.  Hypercoagulable panel has been ordered by intensivist already.

The patient needs to be on oral anticoagulant for at least a year or could be

more depending on when we get the hypercoagulable panel results.

 

Further recommendations to follow.

 

Thank you for asking my opinion.

 

 

 

                              _________________________________

                              MD ROCIO Mckay/DEMETRIS

D:  3/27/2017/8:03 PM

T:  3/27/2017/9:16 PM

Visit #:  [id number]

Job #:  [id number]

MTDARIANA

## 2017-03-27 NOTE — RADRPT
EXAM DATE/TIME:  03/27/2017 15:42 

 

[facility] COMPARISON:     

No previous studies available for comparison.

        

 

 

INDICATIONS :                

Bilateral leg pain. 

            

 

MEDICAL HISTORY :        

Obesity. Shortness of breath. 

 

SURGICAL HISTORY :     

None. 

 

ENCOUNTER:     

Initial

 

ACUITY:     

1 day

 

PAIN SCORE:      

0/10

 

LOCATION:      

Bilateral  legs. 

                       

 

TECHNIQUE:     

Venous ultrasound of the left and right leg was performed from the inguinal ligament to the proximal 
calf.  Real-time, color Doppler and spectral tracing, compression and augmentation techniques were us
ed.  

 

FINDINGS:     

 

RIGHT LEG:     

There is normal compressibility of the deep venous system from the inguinal region to the proximal ca
lf.  No echogenic clot is seen in the lumen of the common femoral, femoral, popliteal, and posterior 
tibial veins.  There is a normal response of the venous system to proximal and distal augmentation an
d respiration.  

 

LEFT LEG:     

There is nonocclusive thrombus in the left popliteal vein extending above the knee.

 

 

CONCLUSION:     Nonocclusive thrombus left popliteal vein 

 

 

 Ever Leal MD FACR on March 27, 2017 at 16:20           

Board Certified Radiologist.

 This report was verified electronically.

## 2017-03-28 VITALS
DIASTOLIC BLOOD PRESSURE: 61 MMHG | OXYGEN SATURATION: 94 % | RESPIRATION RATE: 20 BRPM | TEMPERATURE: 97.9 F | HEART RATE: 88 BPM | SYSTOLIC BLOOD PRESSURE: 128 MMHG

## 2017-03-28 VITALS
OXYGEN SATURATION: 95 % | TEMPERATURE: 97.8 F | HEART RATE: 83 BPM | RESPIRATION RATE: 20 BRPM | SYSTOLIC BLOOD PRESSURE: 118 MMHG | DIASTOLIC BLOOD PRESSURE: 70 MMHG

## 2017-03-28 VITALS
SYSTOLIC BLOOD PRESSURE: 162 MMHG | HEART RATE: 83 BPM | DIASTOLIC BLOOD PRESSURE: 86 MMHG | TEMPERATURE: 98 F | OXYGEN SATURATION: 94 % | RESPIRATION RATE: 20 BRPM

## 2017-03-28 VITALS
TEMPERATURE: 98.6 F | RESPIRATION RATE: 20 BRPM | SYSTOLIC BLOOD PRESSURE: 120 MMHG | HEART RATE: 95 BPM | DIASTOLIC BLOOD PRESSURE: 73 MMHG | OXYGEN SATURATION: 96 %

## 2017-03-28 VITALS — HEART RATE: 84 BPM

## 2017-03-28 VITALS
DIASTOLIC BLOOD PRESSURE: 70 MMHG | OXYGEN SATURATION: 95 % | TEMPERATURE: 98.6 F | RESPIRATION RATE: 20 BRPM | SYSTOLIC BLOOD PRESSURE: 134 MMHG | HEART RATE: 88 BPM

## 2017-03-28 VITALS — HEART RATE: 80 BPM

## 2017-03-28 VITALS
OXYGEN SATURATION: 95 % | SYSTOLIC BLOOD PRESSURE: 131 MMHG | HEART RATE: 80 BPM | DIASTOLIC BLOOD PRESSURE: 70 MMHG | TEMPERATURE: 98.7 F | RESPIRATION RATE: 20 BRPM

## 2017-03-28 VITALS — OXYGEN SATURATION: 95 %

## 2017-03-28 VITALS — HEART RATE: 85 BPM

## 2017-03-28 VITALS — OXYGEN SATURATION: 96 %

## 2017-03-28 LAB
ANION GAP SERPL CALC-SCNC: 8 MEQ/L (ref 5–15)
APTT BLD: 27.8 SEC (ref 24.3–30.1)
APTT BLD: 28.5 SEC (ref 24.3–30.1)
APTT BLD: 28.6 SEC (ref 24.3–30.1)
APTT BLD: 31.5 SEC (ref 24.3–30.1)
BASOPHILS # BLD AUTO: 0.1 TH/MM3 (ref 0–0.2)
BASOPHILS NFR BLD: 0.7 % (ref 0–2)
BASOPHILS NFR BLD: 1 % (ref 0–2)
BASOPHILS NFR BLD: 1 % (ref 0–2)
BUN SERPL-MCNC: 16 MG/DL (ref 7–18)
CHLORIDE SERPL-SCNC: 109 MEQ/L (ref 98–107)
EOSINOPHIL # BLD: 0.2 TH/MM3 (ref 0–0.4)
EOSINOPHIL NFR BLD: 1 % (ref 0–4)
EOSINOPHIL NFR BLD: 1.4 % (ref 0–4)
EOSINOPHIL NFR BLD: 1.4 % (ref 0–4)
ERYTHROCYTE [DISTWIDTH] IN BLOOD BY AUTOMATED COUNT: 14.3 % (ref 11.6–17.2)
ERYTHROCYTE [DISTWIDTH] IN BLOOD BY AUTOMATED COUNT: 14.7 % (ref 11.6–17.2)
ERYTHROCYTE [DISTWIDTH] IN BLOOD BY AUTOMATED COUNT: 14.9 % (ref 11.6–17.2)
FIBRINOGEN PPP-MCNC: 408 MG/DL (ref 181–393)
FIBRINOGEN PPP-MCNC: 415 MG/DL (ref 227–377)
GFR SERPLBLD BASED ON 1.73 SQ M-ARVRAT: 68 ML/MIN (ref 89–?)
HCO3 BLD-SCNC: 23.9 MEQ/L (ref 21–32)
HCT VFR BLD CALC: 38.7 % (ref 39–51)
HCT VFR BLD CALC: 38.9 % (ref 39–51)
HCT VFR BLD CALC: 40.2 % (ref 39–51)
HEMO FLAGS: (no result)
LYMPHOCYTES # BLD AUTO: 2.7 TH/MM3 (ref 1–4.8)
LYMPHOCYTES # BLD AUTO: 3.1 TH/MM3 (ref 1–4.8)
LYMPHOCYTES # BLD AUTO: 4.8 TH/MM3 (ref 1–4.8)
LYMPHOCYTES NFR BLD AUTO: 18.2 % (ref 9–44)
LYMPHOCYTES NFR BLD AUTO: 21.9 % (ref 9–44)
LYMPHOCYTES NFR BLD AUTO: 27 % (ref 9–44)
MAGNESIUM SERPL-MCNC: 2.3 MG/DL (ref 1.5–2.5)
MCH RBC QN AUTO: 28.7 PG (ref 27–34)
MCH RBC QN AUTO: 29 PG (ref 27–34)
MCH RBC QN AUTO: 29.3 PG (ref 27–34)
MCHC RBC AUTO-ENTMCNC: 32.1 % (ref 32–36)
MCHC RBC AUTO-ENTMCNC: 32.7 % (ref 32–36)
MCHC RBC AUTO-ENTMCNC: 33 % (ref 32–36)
MCV RBC AUTO: 88.8 FL (ref 80–100)
MCV RBC AUTO: 88.9 FL (ref 80–100)
MCV RBC AUTO: 89.4 FL (ref 80–100)
MONOCYTES NFR BLD: 11 % (ref 0–8)
MONOCYTES NFR BLD: 11.2 % (ref 0–8)
MONOCYTES NFR BLD: 12.8 % (ref 0–8)
NEUTROPHILS # BLD AUTO: 10.7 TH/MM3 (ref 1.8–7.7)
NEUTROPHILS # BLD AUTO: 9.1 TH/MM3 (ref 1.8–7.7)
NEUTROPHILS # BLD AUTO: 9.8 TH/MM3 (ref 1.8–7.7)
NEUTROPHILS NFR BLD AUTO: 60.3 % (ref 16–70)
NEUTROPHILS NFR BLD AUTO: 64.5 % (ref 16–70)
NEUTROPHILS NFR BLD AUTO: 66.6 % (ref 16–70)
PLATELET # BLD: 247 TH/MM3 (ref 150–450)
PLATELET # BLD: 261 TH/MM3 (ref 150–450)
PLATELET # BLD: 308 TH/MM3 (ref 150–450)
POTASSIUM SERPL-SCNC: 3.9 MEQ/L (ref 3.5–5.1)
RBC # BLD AUTO: 4.35 MIL/MM3 (ref 4.5–5.9)
RBC # BLD AUTO: 4.36 MIL/MM3 (ref 4.5–5.9)
RBC # BLD AUTO: 4.52 MIL/MM3 (ref 4.5–5.9)
SODIUM SERPL-SCNC: 141 MEQ/L (ref 136–145)
WBC # BLD AUTO: 14.1 TH/MM3 (ref 4–11)
WBC # BLD AUTO: 14.7 TH/MM3 (ref 4–11)
WBC # BLD AUTO: 17.7 TH/MM3 (ref 4–11)

## 2017-03-28 RX ADMIN — HUMAN INSULIN SCH: 100 INJECTION, SOLUTION SUBCUTANEOUS at 11:00

## 2017-03-28 RX ADMIN — IPRATROPIUM BROMIDE AND ALBUTEROL SULFATE SCH AMPULE: .5; 3 SOLUTION RESPIRATORY (INHALATION) at 03:42

## 2017-03-28 RX ADMIN — PHENYTOIN SODIUM SCH MLS/HR: 50 INJECTION INTRAMUSCULAR; INTRAVENOUS at 07:52

## 2017-03-28 RX ADMIN — CHLORHEXIDINE GLUCONATE SCH PACK: 500 CLOTH TOPICAL at 00:44

## 2017-03-28 RX ADMIN — IPRATROPIUM BROMIDE AND ALBUTEROL SULFATE SCH AMPULE: .5; 3 SOLUTION RESPIRATORY (INHALATION) at 16:00

## 2017-03-28 RX ADMIN — IPRATROPIUM BROMIDE AND ALBUTEROL SULFATE SCH AMPULE: .5; 3 SOLUTION RESPIRATORY (INHALATION) at 07:42

## 2017-03-28 RX ADMIN — IPRATROPIUM BROMIDE AND ALBUTEROL SULFATE SCH AMPULE: .5; 3 SOLUTION RESPIRATORY (INHALATION) at 11:11

## 2017-03-28 RX ADMIN — Medication SCH ML: at 09:00

## 2017-03-28 RX ADMIN — IPRATROPIUM BROMIDE AND ALBUTEROL SULFATE SCH AMPULE: .5; 3 SOLUTION RESPIRATORY (INHALATION) at 23:30

## 2017-03-28 RX ADMIN — HUMAN INSULIN SCH: 100 INJECTION, SOLUTION SUBCUTANEOUS at 17:00

## 2017-03-28 RX ADMIN — IPRATROPIUM BROMIDE AND ALBUTEROL SULFATE SCH AMPULE: .5; 3 SOLUTION RESPIRATORY (INHALATION) at 19:43

## 2017-03-28 RX ADMIN — HUMAN INSULIN SCH: 100 INJECTION, SOLUTION SUBCUTANEOUS at 05:00

## 2017-03-28 NOTE — HHI.CCPN
Subjective


Remarks/Hospital Course


The patient is a 56-year-old male with past medical history of morbid obesity 

who presented to [facility] ED with a one week history of


left-sided chest pain that radiates into his flank associated with shortness of 

breath for 4 days.  Last week he was diagnosed with pneumonia by his PCP and


started on Levaquin which he has been taking for the past 5 days. He denies any 

symptoms of edema of lower extremity or PND.  Also the patient denies any


nausea, vomiting or abdominal pain.  On arrival to the ER he was tachycardiac 

and hypoxemic with O2 saturation of 85%.  The patient was placed on 5 liters


nasal cannula with saturation improved to 95%.  Chest x-ray in the ER showed 

cardiomegaly with subsegmental atelectasis in the left lung base and small left 

pleural effusion.  Due to his respiratory distress CT angiogram of the chest 

was obtained which showed massive bilateral PE, right greater than left with


subpleural atelectatic changes in the left lower lobe. His laboratory data 

significant for Troponin of 0.56 and elevated BNP of 421. In the ER the patient 

was given heparin bolus and just started on heparin drip. Case discussed with 

Dr. Parrish  from interventional radiology, and plan to proceed with t-PA 

infusion at 2 mg an hour and heparin drip 500 units per hour. The patient 

denies prior history of pulmonary embolism or DVT.  However, he states that his 

brother had a DVT in the past.





3/28 Patient is awake and alert denies any worsening SOB. On TPA and heparin 

infusion. Awake and alert. On 4L oxygen.





Objective





 Vital Signs








  Date Time  Temp Pulse Resp B/P Pulse Ox O2 Delivery O2 Flow Rate FiO2


 


3/28/17 07:42     96 Nasal Cannula 4.00 


 


3/28/17 06:00  80      


 


3/28/17 04:00 97.8  20 118/70    








 Intake and Output








 3/27/17 3/27/17 3/28/17





 08:00 16:00 00:00


 


Intake Total   665 ml


 


Output Total   650 ml


 


Balance   15 ml








Result Diagram:  


3/28/17 0528                                                                   

             3/28/17 0528





Other Results





 Laboratory Tests








Test 3/27/17 3/27/17 3/27/17 3/27/17





 13:30 14:20 16:34 18:05


 


White Blood Count 16.0 TH/MM3 15.5 TH/MM3  


 


Red Blood Count 4.97 MIL/MM3 4.70 MIL/MM3  


 


Hemoglobin 14.4 GM/DL 13.6 GM/DL  


 


Hematocrit 43.9 % 41.4 %  


 


Mean Corpuscular Volume 88.2 FL 88.1 FL  


 


Mean Corpuscular Hemoglobin 28.9 PG 28.9 PG  


 


Mean Corpuscular Hemoglobin 32.7 % 32.8 %  





Concent    


 


Red Cell Distribution Width 14.5 % 14.9 %  


 


Platelet Count 348 TH/MM3 339 TH/MM3  


 


Mean Platelet Volume 7.6 FL 7.6 FL  


 


Neutrophils (%) (Auto) 74.4 %   


 


Lymphocytes (%) (Auto) 15.3 %   


 


Monocytes (%) (Auto) 8.7 %   


 


Eosinophils (%) (Auto) 0.9 %   


 


Basophils (%) (Auto) 0.7 %   


 


Neutrophils # (Auto) 11.9 TH/MM3   


 


Lymphocytes # (Auto) 2.4 TH/MM3   


 


Monocytes # (Auto) 1.4 TH/MM3   


 


Eosinophils # (Auto) 0.1 TH/MM3   


 


Basophils # (Auto) 0.1 TH/MM3   


 


CBC Comment DIFF FINAL    


 


Differential Comment     


 


Sodium Level 136 MEQ/L   


 


Potassium Level 4.2 MEQ/L   


 


Chloride Level 104 MEQ/L   


 


Carbon Dioxide Level 20.2 MEQ/L   


 


Anion Gap 12 MEQ/L   


 


Blood Urea Nitrogen 15 MG/DL   


 


Creatinine 1.23 MG/DL   


 


Estimat Glomerular Filtration 61 ML/MIN   





Rate    


 


Random Glucose 120 MG/DL   


 


Calcium Level 9.0 MG/DL   


 


Total Bilirubin 0.4 MG/DL   


 


Aspartate Amino Transf 35 U/L   





(AST/SGOT)    


 


Alanine Aminotransferase 36 U/L   





(ALT/SGPT)    


 


Alkaline Phosphatase 98 U/L   


 


Troponin I 0.56 NG/ML  0.71 NG/ML 


 


B-Type Natriuretic Peptide 421 PG/ML   


 


Total Protein 8.3 GM/DL   


 


Albumin 2.9 GM/DL   


 


Prothrombin Time  12.5 SEC  


 


Prothromb Time International  1.1 RATIO  





Ratio    


 


Activated Partial  28.1 SEC  





Thromboplast Time    


 


Nasal Screen MRSA (PCR)    NEGATIVE 


 


    





Test 3/27/17 3/27/17 3/28/17 3/28/17





 18:20 21:35 00:03 05:28


 


White Blood Count 16.4 TH/MM3  17.7 TH/MM3 14.1 TH/MM3


 


Red Blood Count 4.65 MIL/MM3  4.52 MIL/MM3 4.35 MIL/MM3


 


Hemoglobin 13.3 GM/DL  13.1 GM/DL 12.5 GM/DL


 


Hematocrit 41.5 %  40.2 % 38.9 %


 


Mean Corpuscular Volume 89.2 FL  88.8 FL 89.4 FL


 


Mean Corpuscular Hemoglobin 28.5 PG  29.0 PG 28.7 PG


 


Mean Corpuscular Hemoglobin 31.9 %  32.7 % 32.1 %





Concent    


 


Red Cell Distribution Width 14.4 %  14.3 % 14.7 %


 


Platelet Count 320 TH/MM3  308 TH/MM3 261 TH/MM3


 


Mean Platelet Volume 7.8 FL  7.4 FL 7.3 FL


 


Neutrophils (%) (Auto) 66.6 %  60.3 % 64.5 %


 


Lymphocytes (%) (Auto) 21.8 %  27.0 % 21.9 %


 


Monocytes (%) (Auto) 9.7 %  11.0 % 11.2 %


 


Eosinophils (%) (Auto) 0.9 %  1.0 % 1.4 %


 


Basophils (%) (Auto) 1.0 %  0.7 % 1.0 %


 


Neutrophils # (Auto) 10.9 TH/MM3  10.7 TH/MM3 9.1 TH/MM3


 


Lymphocytes # (Auto) 3.6 TH/MM3  4.8 TH/MM3 3.1 TH/MM3


 


Monocytes # (Auto) 1.6 TH/MM3  1.9 TH/MM3 1.6 TH/MM3


 


Eosinophils # (Auto) 0.1 TH/MM3  0.2 TH/MM3 0.2 TH/MM3


 


Basophils # (Auto) 0.2 TH/MM3  0.1 TH/MM3 0.1 TH/MM3


 


CBC Comment DIFF FINAL   DIFF FINAL  DIFF FINAL 


 


Differential Comment       


 


Activated Partial 31.0 SEC 29.6 SEC 28.6 SEC 27.8 SEC





Thromboplast Time    


 


Fibrinogen 428 mg/dL 427 mg/dL  415 mg/dL


 


Urine Color YELLOW    


 


Urine Turbidity CLEAR    


 


Urine pH 6.0    


 


Urine Specific Gravity 1.033    


 


Urine Protein 100 mg/dL   


 


Urine Glucose (UA) NEG mg/dL   


 


Urine Ketones NEG mg/dL   


 


Urine Occult Blood MOD    


 


Urine Nitrite NEG    


 


Urine Bilirubin NEG    


 


Urine Urobilinogen LESS THAN 2.0   





 MG/DL   


 


Urine Leukocyte Esterase NEG    


 


Urine RBC 14 /hpf   


 


Urine WBC 7 /hpf   


 


Urine Squamous Epithelial <1 /hpf   





Cells    


 


Urine Mucus FEW /lpf   


 


Microscopic Urinalysis Comment CATH-CULTURE   





 IND   


 


Troponin I  0.60 NG/ML  


 


Sodium Level    141 MEQ/L


 


Potassium Level    3.9 MEQ/L


 


Chloride Level    109 MEQ/L


 


Carbon Dioxide Level    23.9 MEQ/L


 


Anion Gap    8 MEQ/L


 


Blood Urea Nitrogen    16 MG/DL


 


Creatinine    1.11 MG/DL


 


Estimat Glomerular Filtration    68 ML/MIN





Rate    


 


Random Glucose    95 MG/DL


 


Calcium Level    8.5 MG/DL


 


Phosphorus Level    3.9 MG/DL


 


Magnesium Level    2.3 MG/DL








Imaging





Last Impressions








Chest X-Ray 3/27/17 1654 Signed





Impressions: 





 Service Date/Time:  Monday, March 27, 2017 17:16 - CONCLUSION:  Right IJ 

central 





 venous catheter with the tip projecting over the central venous system.     





 Urban Parrish MD 


 


CT Angiography 3/27/17 1303 Signed





Impressions: 





 Service Date/Time:  Monday, March 27, 2017 13:46 - CONCLUSION:  1. Massive 





 bilateral PE, right greater than left. 2. Subpleural atelectatic changes in 

the 





 left lower lobe and atelectatic changes paralleling the left major fissure, 





 likely related to the large clot burden in the pulmonary artery. 3. Results 

were 





 called to Dr. Watson in the ED at the time of this dictation     Urban Parrish MD 


 


Lower Extremity Ultrasound 3/27/17 0000 Signed





Impressions: 





 Service Date/Time:  Monday, March 27, 2017 15:42 - CONCLUSION: Nonocclusive 





 thrombus left popliteal vein     Ever Leal MD  FACR








Objective Remarks


GENERAL: Patient is 55 yo lying in bed in NAD


SKIN: Warm and dry.


HEAD: Normocephalic.


EYES: No scleral icterus. No injection or drainage. 


NECK: Supple, trachea midline. No JVD or lymphadenopathy.


CARDIOVASCULAR: Regular rate and rhythm without murmurs, gallops, or rubs. 


RESPIRATORY: Breath sounds equal bilaterally. No accessory muscle use.


GASTROINTESTINAL: Abdomen soft, non-tender, nondistended. 


MUSCULOSKELETAL: No cyanosis, or edema. 


BACK: Nontender without obvious deformity. No CVA tenderness.


Neuro: Awake and alert








A/P


Assessment and Plan


1. Respiratory failure.


2. Massive bilateral pulmonary embolism.


3. Morbid obesity.


4. Leukocytosis.


5. Elevated troponin likely 2nd PE


 


Plan





Neuro: Monitor neuro status and avoid any sedatives.





Pulm: Continue with oxygen and maintain sats > 92%.


      Bronchodilators 


      On T-PA infusion 2 mg/hr & heparin drip


      Will continue with TPA infusion till 5 pm per Dr. Parrish , on heparin 

drip 500u/hr once TPA is stopped will advance heparin drip at therapeutic dose 

per 


      protocol.  start Eliquis in 1-2 days discussed with Dr. Roa.





CV: Monitor HR and BP keep MAP> 65 mmHg.


     Monitor troponin, follow up on echo results.





: Monitor renal function Is and Os and electrolyte replacement as needed.


      d/c IVF





GI: on Protonix 40 mg IV daily for GI prophylaxis.


      d/c IVF





ID: Monitor for signs of infections ( fever and WBC) Will panculture if spikes 

fever.





Endo:SSI with Accu-Cheks if needed for glycemic control.





Heme: Monitor CBC and coags 


          Follow up on hypercoagulable profile. Hematology is following- Dr. Roa





GI prophylaxis with Protonix 40 mg daily and DVT prophylaxis on heparin drip.


Doppler US LE :non-occlusive thrombus left popliteal vein.





Level 3








Natanael Villarreal MD Mar 28, 2017 08:24

## 2017-03-28 NOTE — RADRPT
EXAM DATE/TIME:  03/27/2017 00:00 

 

[facility] COMPARISON:  

No previous studies available for comparison.

 

 

INDICATIONS :               

Patient presents with bilateral pleural effusions in need of central line placement for medication ad
ministration.

                     

 

MEDICAL HISTORY :     

Morbid obesity

 

SURGICAL HISTORY :     

n/a

 

ENCOUNTER:     

Initial

 

ACUITY:     

1 day

 

PAIN SCORE:     

0/10

 

LOCATION:        

N/A

                     

                      

 

IMAGE SERIES:      

0

                     

 

ACCESS:     

Right internal jugular vein 

 

      

DEVICE(S):      

1.)  7 Monegasque dual lumen 15 cm Arrow central line      

 

 

TECH NOTE:   

Chest radiograph taken to confirm line placement.YAO BLACKWELL MR#:[id number] DOB[birth year] Exam Dt/De
sc: March 27, 2017CENTRAL LINE PLACEMENT W US GUIDANCE ONLY, RIGHT     

 

PROCEDURE :     

1.  Ultrasound guided venipuncture.

2.  Central line placement.

 

The risks, benefits and alternatives to the procedure were explained and verbal and written consent w
as obtained.  The site was prepped in sterile fashion.  Full sterile technique was used, including ca
p, mask, sterile gloves and gown and a large sterile sheet.  Hand hygiene and 2% chlorhexidine prep w
as utilized per protocol for cutaneous antisepsis with appropriate dry time for site.  The skin and s
ubcutaneous tissues were infiltrated with local anesthetic solution.

 

With ultrasound guidance a dermatotomy in the neck was created and subcutaneous dissection was perfor
med.  A micropuncture set was used to gain access and serial dilatation was performed to accept the c
atheter as prescribed above. The catheter was fixed in place with suture and a sterile dressing was a
pplied. 

 

The patient tolerated the procedure well and there were no complications.  Chest radiograph is to be 
obtained to document position.

 

CONCLUSION:     

1. Uncomplicated line placement as above.  The procedure was performed in the patient's stretcher due
 to table weight limitations.

2. Access was obtained to facilitate administration of IV TPA for large volume pulmonary embolus. Inf
usion was initiated in the IR department. 

 

 

 Urban Parrish MD on March 28, 2017 at 11:45           

Board Certified Radiologist.

 This report was verified electronically.

## 2017-03-28 NOTE — EC
Study

 

Study Date:03/28/2017

 

 

 

STUDY CONCLUSIONS

 

SUMMARY

 

LEFT VENTRICLE:

The cavity size was normal. Wall thickness was

normal. Systolic function was normal. The estimated ejection

fraction was in the range of 55% to 60%. Wall motion was normal;

there were no regional wall motion abnormalities.

 

-------------------------------------------------------------------

If LV function is below 40, please consider prescribing an ACEI or

ARB or document rationale for non-use.

 

-------------------------------------------------------------------

PROCEDURE DATA

 

STUDY STATUS:

Elective. Procedure: Transthoracic echocardiography.

Image quality was poor. Scanning was performed from the

parasternal, apical, and subcostal acoustic windows. Study

completion: The patient tolerated the procedure well.

Transthoracic echocardiography. M-mode, complete 2D, complete

spectral Doppler, and color Doppler. Height: Height: 74in. Weight:

Weight: 399.2lb. Body mass index: BMI: 51.4kg/m^2. Body surface

area:   BSA: 2.92m^2. Patient status: Inpatient.

 

-------------------------------------------------------------------

CARDIAC ANATOMY

 

LEFT VENTRICLE:

The cavity size was normal. Wall thickness was

normal. Systolic function was normal. The estimated ejection

fraction was in the range of 55% to 60%. Wall motion was normal;

there were no regional wall motion abnormalities.

 

AORTIC VALVE:

Trileaflet; normal thickness leaflets. Doppler:

Transvalvular velocity was within the normal range. There was no

stenosis. No regurgitation.

 

AORTA:

Aortic root: The aortic root was normal in size.

 

MITRAL VALVE:

Structurally normal valve. Doppler: Transvalvular

velocity was within the normal range. There was no evidence for

stenosis. No regurgitation.  Valve area by pressure half-time:

4.07cm^2. Indexed valve area by pressure half-time: 1.39cm^2/m^2.

 

LEFT ATRIUM:

The atrium was normal in size.

 

RIGHT VENTRICLE:

The cavity size was normal. Wall thickness was

normal.

 

PULMONIC VALVE:

Doppler: Transvalvular velocity was within the

normal range. There was no evidence for stenosis. No regurgitation.

 

TRICUSPID VALVE:

Structurally normal valve. Doppler: Transvalvular

velocity was within the normal range. No regurgitation.

 

PULMONARY ARTERY:

The main pulmonary artery was normal-sized.

Systolic pressure was within the normal range.

 

RIGHT ATRIUM:

The atrium was normal in size.

 

PERICARDIUM:

There was no pericardial effusion.

 

SYSTEMIC VEINS:

Inferior vena cava: The vessel was normal in size.

 

-------------------------------------------------------------------

 

Patient weight: 399.2lb

_Ejection fraction:_ 65-75%

_Fractional shortening:_ 32%

up to 5Kg 5-11.5Kg 11.6-22.9Kg 23-45Kg 45-57Kg

Aortic Root 7-13      <17      13-22       17-27   17-27

LA diam     6-13      <23      24-38       33-47   37-40

RVID        10-17     7-15     7-15        7-18    8-17

LVIDd       12-22     <32      24-38       33-47   37-40

LVPW        2-4       3-6      5-7         6-8     7-8

IVS         2-4       3-6      5-7         6-8     7-8

 

-------------------------------------------------------------------

 

BASIC MEASUREMENTS                                     ADULT NORMAL

Left ventricle

LV internal dimension, ED, chordal      *34.8 mm       43-52

level, PLAX

LV internal dimension, ES, chordal      *22.8 mm       23-38

level, PLAX

Fractional shortening, chordal level,      34 %        >29

PLAX

LV posterior wall thickness, ED          13.5 mm       ------------

IVS/LVPW ratio, ED                       0.99          <1.3

Ventricular septum

Septal thickness, ED                     13.4 mm       ------------

Aortic valve

Leaflet separation                         23 mm       15-26

Left atrium

Anterior-posterior dimension               31 mm       ------------

Anterior-posterior dimension index       1.06 cm/m^2   <2.2

 

BASIC MEASUREMENTS                                     ADULT NORMAL

Aortic valve

Leaflet separation                         23 mm       15-26

Aorta

Root diameter, ED                          32 mm       20-37

 

DOPPLER MEASUREMENTS                                   ADULT NORMAL

Aortic valve

Peak velocity, S                          152 cm/s     ------------

Mitral valve

Peak E-wave velocity                     43.9 cm/s     ------------

Peak A-wave velocity                     64.2 cm/s     ------------

Pressure half-time                         54 ms       ------------

Peak E/A ratio                            0.7          ------------

Valve area, pressure half-time           4.07 cm^2     ------------

Valve area index, pressure half-time     1.39 cm^2/m^2 ------------

Pulmonic valve

Peak velocity, S                         86.9 cm/s     ------------

 

LEGEND:

Mean values are shown as u=mean value.

Asterisk (*) marks values outside specified normal range.

Prepared and signed by

 

Nitish Tolbert

[id number]

## 2017-03-28 NOTE — PD.ONC.PN
Subjective


Subjective


Remarks


Afebrile overnight.  Patient states his breathing has improved somewhat since 

starting on the tpa infusion. He denies any chest pain.  He is frustrated that 

he is bedbound right now.





Objective


Data











  Date Time  Temp Pulse Resp B/P Pulse Ox O2 Delivery O2 Flow Rate FiO2


 


3/28/17 07:42     96 Nasal Cannula 4.00 


 


3/28/17 06:00  80      


 


3/28/17 04:00 97.8 83 20 118/70 95   


 


3/28/17 04:00  83      


 


3/28/17 02:00  85      


 


3/28/17 00:00 98.0 101 20 162/86 94   


 


3/28/17 00:00  83      


 


3/27/17 22:00  90      


 


3/27/17 20:00 97.8 94 20 118/67 96   


 


3/27/17 20:00  93      


 


3/27/17 19:45     97 Nasal Cannula 5.00 


 


3/27/17 17:18     94 Nasal Cannula 5.00 


 


3/27/17 16:40  98 20 118/78 95 Nasal Cannula 6 


 


3/27/17 16:00  98 20 133/65 95 Nasal Cannula 5 


 


3/27/17 15:00  103 20 105/73 95 Nasal Cannula 5 








Result Diagram:  


3/28/17 0528                                                                   

             3/28/17 0528





Laboratory Results





Laboratory Tests








Test 3/27/17 3/27/17 3/27/17 3/27/17





 14:20 16:34 18:05 18:20


 


White Blood Count 15.5 TH/MM3   16.4 TH/MM3


 


Red Blood Count 4.70 MIL/MM3   4.65 MIL/MM3


 


Hemoglobin 13.6 GM/DL   13.3 GM/DL


 


Hematocrit 41.4 %   41.5 %


 


Mean Corpuscular Volume 88.1 FL   89.2 FL


 


Mean Corpuscular Hemoglobin 28.9 PG   28.5 PG


 


Mean Corpuscular Hemoglobin 32.8 %   31.9 %





Concent    


 


Red Cell Distribution Width 14.9 %   14.4 %


 


Platelet Count 339 TH/MM3   320 TH/MM3


 


Mean Platelet Volume 7.6 FL   7.8 FL


 


Prothrombin Time 12.5 SEC   


 


Prothromb Time International 1.1 RATIO   





Ratio    


 


Activated Partial 28.1 SEC   31.0 SEC





Thromboplast Time    


 


Troponin I  0.71 NG/ML  


 


Nasal Screen MRSA (PCR)   NEGATIVE  


 


Neutrophils (%) (Auto)    66.6 %


 


Lymphocytes (%) (Auto)    21.8 %


 


Monocytes (%) (Auto)    9.7 %


 


Eosinophils (%) (Auto)    0.9 %


 


Basophils (%) (Auto)    1.0 %


 


Neutrophils # (Auto)    10.9 TH/MM3


 


Lymphocytes # (Auto)    3.6 TH/MM3


 


Monocytes # (Auto)    1.6 TH/MM3


 


Eosinophils # (Auto)    0.1 TH/MM3


 


Basophils # (Auto)    0.2 TH/MM3


 


CBC Comment    DIFF FINAL 


 


Differential Comment     


 


Fibrinogen    428 mg/dL


 


Urine Color    YELLOW 


 


Urine Turbidity    CLEAR 


 


Urine pH    6.0 


 


Urine Specific Gravity    1.033 


 


Urine Protein    100 mg/dL


 


Urine Glucose (UA)    NEG mg/dL


 


Urine Ketones    NEG mg/dL


 


Urine Occult Blood    MOD 


 


Urine Nitrite    NEG 


 


Urine Bilirubin    NEG 


 


Urine Urobilinogen    LESS THAN 2.0





    MG/DL


 


Urine Leukocyte Esterase    NEG 


 


Urine RBC    14 /hpf


 


Urine WBC    7 /hpf


 


Urine Squamous Epithelial    <1 /hpf





Cells    


 


Urine Mucus    FEW /lpf


 


Microscopic Urinalysis Comment    CATH-CULTURE





    IND


 


    





Test 3/27/17 3/28/17 3/28/17 





 21:35 00:03 05:28 


 


Troponin I 0.60 NG/ML   


 


Activated Partial 29.6 SEC 28.6 SEC 27.8 SEC 





Thromboplast Time    


 


Fibrinogen 427 mg/dL  415 mg/dL 


 


White Blood Count  17.7 TH/MM3 14.1 TH/MM3 


 


Red Blood Count  4.52 MIL/MM3 4.35 MIL/MM3 


 


Hemoglobin  13.1 GM/DL 12.5 GM/DL 


 


Hematocrit  40.2 % 38.9 % 


 


Mean Corpuscular Volume  88.8 FL 89.4 FL 


 


Mean Corpuscular Hemoglobin  29.0 PG 28.7 PG 


 


Mean Corpuscular Hemoglobin  32.7 % 32.1 % 





Concent    


 


Red Cell Distribution Width  14.3 % 14.7 % 


 


Platelet Count  308 TH/MM3 261 TH/MM3 


 


Mean Platelet Volume  7.4 FL 7.3 FL 


 


Neutrophils (%) (Auto)  60.3 % 64.5 % 


 


Lymphocytes (%) (Auto)  27.0 % 21.9 % 


 


Monocytes (%) (Auto)  11.0 % 11.2 % 


 


Eosinophils (%) (Auto)  1.0 % 1.4 % 


 


Basophils (%) (Auto)  0.7 % 1.0 % 


 


Neutrophils # (Auto)  10.7 TH/MM3 9.1 TH/MM3 


 


Lymphocytes # (Auto)  4.8 TH/MM3 3.1 TH/MM3 


 


Monocytes # (Auto)  1.9 TH/MM3 1.6 TH/MM3 


 


Eosinophils # (Auto)  0.2 TH/MM3 0.2 TH/MM3 


 


Basophils # (Auto)  0.1 TH/MM3 0.1 TH/MM3 


 


CBC Comment  DIFF FINAL  DIFF FINAL  


 


Differential Comment      


 


Sodium Level   141 MEQ/L 


 


Potassium Level   3.9 MEQ/L 


 


Chloride Level   109 MEQ/L 


 


Carbon Dioxide Level   23.9 MEQ/L 


 


Anion Gap   8 MEQ/L 


 


Blood Urea Nitrogen   16 MG/DL 


 


Creatinine   1.11 MG/DL 


 


Estimat Glomerular Filtration   68 ML/MIN 





Rate    


 


Random Glucose   95 MG/DL 


 


Calcium Level   8.5 MG/DL 


 


Phosphorus Level   3.9 MG/DL 


 


Magnesium Level   2.3 MG/DL 








Culture Results





Microbiology








 Date/Time Procedure Status





Source Growth 


 


 3/27/17 18:20 Urine Culture Received





Urine Catheterized Urine Pending 








Imaging Studies





Last 24 hours Impressions








Chest X-Ray 3/27/17 4534 Signed





Impressions: 





 Service Date/Time:  Monday, March 27, 2017 17:16 - CONCLUSION:  Right IJ 

central 





 venous catheter with the tip projecting over the central venous system.     





 Urban Parrish MD 











Administered Medications








 Medications


  (Trade)  Dose


 Ordered  Sig/Madie


 Route


 PRN Reason  Start Time


 Stop Time Status Last Admin


Dose Admin


 


 Chlorhexidine


 Gluconate 3 pack  3 pack


 Taper  DAILY@04


 TOP


   3/28/17 04:00


 3/24/18 03:59  3/28/17 00:44


 


 


 Alteplase,


 Recombinant/


 Sodium Chloride


  (Cathflo


 Activase Inj/NS


 500 ml Inj)  500 ml @ 0


 mls/hr  CONTINUOUS


 IV


   3/27/17 17:45


    3/27/17 18:07


 








Objective Remarks


GENERAL: Middle aged obese male, sitting up in bed on 4L O2 via NC.


SKIN: Warm and dry.


HEAD: Normocephalic.


EYES: No injection or drainage. 


NECK: Supple, trachea midline. 


CARDIOVASCULAR: Regular rate and rhythm


RESPIRATORY: Breath sounds equal bilaterally. No accessory muscle use.


GASTROINTESTINAL: Abdomen soft, non-tender, nondistended. 


EXTREMITIES: No cyanosis. 


MUSCULOSKELETAL: Adequate muscle tone.


NEUROLOGICAL: awake and alert, normal speech. moving all extremities.





Assessment/Plan


Problem List:  


(1) Pulmonary embolus


Status:  Acute


Plan:  3/28/17: follow protocol for heparin gtt per invasive radiology s/p TPA. 

will start oral anticoagulation once more stable on medical floor.


leg U/S : DVT, left popliteal vein


--s/p TPA infusion


--will need to be on oral anticoagulation for at least a year


--will likely place on xarelto or Eliquis once stable and on a medical floor





Assessment


55y/o male with massive bilateral pulmonary emboli


history of morbid obesity and hard of hearing.  


+ [facility] of DVT


Attending Statement


less sob.


Doppler US = nonocclusive left popliteal DVT.


continue TPA/Heparin


Eliquis in 2-3 days when more stable.


d/w Dr Flores


The exam, history, and the medical decision-making described in the above note 

were completed with the assistance of the mid-level provider. I reviewed and 

agree with the findings presented.  I attest that I had a face-to-face 

encounter with the patient on the same day, and personally performed and 

documented my assessment and findings in the medical record.





Problem Qualifiers





(1) Pulmonary embolus:  


Qualified Code:  I26.09 - Other acute pulmonary embolism with acute cor 

pulmonale





Ioana Lopez Mar 28, 2017 13:37


Mike Roa MD Mar 28, 2017 16:05

## 2017-03-29 VITALS
RESPIRATION RATE: 27 BRPM | SYSTOLIC BLOOD PRESSURE: 130 MMHG | HEART RATE: 82 BPM | TEMPERATURE: 98.8 F | OXYGEN SATURATION: 95 % | DIASTOLIC BLOOD PRESSURE: 76 MMHG

## 2017-03-29 VITALS
TEMPERATURE: 98.3 F | SYSTOLIC BLOOD PRESSURE: 123 MMHG | DIASTOLIC BLOOD PRESSURE: 69 MMHG | OXYGEN SATURATION: 96 % | HEART RATE: 79 BPM | RESPIRATION RATE: 15 BRPM

## 2017-03-29 VITALS
RESPIRATION RATE: 23 BRPM | TEMPERATURE: 98.7 F | DIASTOLIC BLOOD PRESSURE: 63 MMHG | OXYGEN SATURATION: 96 % | SYSTOLIC BLOOD PRESSURE: 125 MMHG | HEART RATE: 82 BPM

## 2017-03-29 VITALS
SYSTOLIC BLOOD PRESSURE: 131 MMHG | DIASTOLIC BLOOD PRESSURE: 73 MMHG | OXYGEN SATURATION: 95 % | HEART RATE: 81 BPM | RESPIRATION RATE: 20 BRPM | TEMPERATURE: 98.4 F

## 2017-03-29 VITALS
RESPIRATION RATE: 24 BRPM | SYSTOLIC BLOOD PRESSURE: 124 MMHG | HEART RATE: 81 BPM | TEMPERATURE: 99.1 F | OXYGEN SATURATION: 96 % | DIASTOLIC BLOOD PRESSURE: 65 MMHG

## 2017-03-29 VITALS — HEART RATE: 81 BPM

## 2017-03-29 VITALS — HEART RATE: 83 BPM

## 2017-03-29 VITALS
DIASTOLIC BLOOD PRESSURE: 66 MMHG | HEART RATE: 85 BPM | SYSTOLIC BLOOD PRESSURE: 137 MMHG | RESPIRATION RATE: 20 BRPM | OXYGEN SATURATION: 95 % | TEMPERATURE: 98.7 F

## 2017-03-29 VITALS — HEART RATE: 82 BPM

## 2017-03-29 VITALS — HEART RATE: 56 BPM

## 2017-03-29 VITALS — OXYGEN SATURATION: 96 %

## 2017-03-29 LAB
ANION GAP SERPL CALC-SCNC: 10 MEQ/L (ref 5–15)
APTT BLD: 29 SEC (ref 24.3–30.1)
APTT BLD: 36.2 SEC (ref 24.3–30.1)
APTT BLD: 36.9 SEC (ref 24.3–30.1)
BASOPHILS # BLD AUTO: 0 TH/MM3 (ref 0–0.2)
BASOPHILS NFR BLD: 0.3 % (ref 0–2)
BUN SERPL-MCNC: 14 MG/DL (ref 7–18)
CHLORIDE SERPL-SCNC: 107 MEQ/L (ref 98–107)
EOSINOPHIL # BLD: 0.2 TH/MM3 (ref 0–0.4)
EOSINOPHIL NFR BLD: 1.7 % (ref 0–4)
ERYTHROCYTE [DISTWIDTH] IN BLOOD BY AUTOMATED COUNT: 14.4 % (ref 11.6–17.2)
GFR SERPLBLD BASED ON 1.73 SQ M-ARVRAT: 77 ML/MIN (ref 89–?)
HCO3 BLD-SCNC: 23.9 MEQ/L (ref 21–32)
HCT VFR BLD CALC: 37.7 % (ref 39–51)
HEMO FLAGS: (no result)
INR PPP: 1.2 RATIO
LYMPHOCYTES # BLD AUTO: 2.8 TH/MM3 (ref 1–4.8)
LYMPHOCYTES NFR BLD AUTO: 19.3 % (ref 9–44)
MCH RBC QN AUTO: 28.5 PG (ref 27–34)
MCHC RBC AUTO-ENTMCNC: 31.7 % (ref 32–36)
MCV RBC AUTO: 89.8 FL (ref 80–100)
MONOCYTES NFR BLD: 11.6 % (ref 0–8)
NEUTROPHILS # BLD AUTO: 9.7 TH/MM3 (ref 1.8–7.7)
NEUTROPHILS NFR BLD AUTO: 67.1 % (ref 16–70)
PLATELET # BLD: 197 TH/MM3 (ref 150–450)
POTASSIUM SERPL-SCNC: 3.6 MEQ/L (ref 3.5–5.1)
PROTHROMBIN TIME: 13.4 SEC (ref 9.8–11.6)
RBC # BLD AUTO: 4.2 MIL/MM3 (ref 4.5–5.9)
SODIUM SERPL-SCNC: 141 MEQ/L (ref 136–145)
WBC # BLD AUTO: 14.5 TH/MM3 (ref 4–11)

## 2017-03-29 RX ADMIN — DOCUSATE SODIUM SCH MG: 100 CAPSULE, LIQUID FILLED ORAL at 09:44

## 2017-03-29 RX ADMIN — IPRATROPIUM BROMIDE AND ALBUTEROL SULFATE SCH AMPULE: .5; 3 SOLUTION RESPIRATORY (INHALATION) at 19:58

## 2017-03-29 RX ADMIN — HUMAN INSULIN SCH: 100 INJECTION, SOLUTION SUBCUTANEOUS at 17:00

## 2017-03-29 RX ADMIN — IPRATROPIUM BROMIDE AND ALBUTEROL SULFATE SCH AMPULE: .5; 3 SOLUTION RESPIRATORY (INHALATION) at 04:06

## 2017-03-29 RX ADMIN — DOCUSATE SODIUM SCH MG: 100 CAPSULE, LIQUID FILLED ORAL at 21:00

## 2017-03-29 RX ADMIN — IPRATROPIUM BROMIDE AND ALBUTEROL SULFATE SCH AMPULE: .5; 3 SOLUTION RESPIRATORY (INHALATION) at 23:35

## 2017-03-29 RX ADMIN — IPRATROPIUM BROMIDE AND ALBUTEROL SULFATE SCH AMPULE: .5; 3 SOLUTION RESPIRATORY (INHALATION) at 11:10

## 2017-03-29 RX ADMIN — CHLORHEXIDINE GLUCONATE SCH PACK: 500 CLOTH TOPICAL at 04:00

## 2017-03-29 RX ADMIN — PANTOPRAZOLE SODIUM SCH MG: 40 INJECTION, POWDER, FOR SOLUTION INTRAVENOUS at 15:54

## 2017-03-29 RX ADMIN — HUMAN INSULIN SCH: 100 INJECTION, SOLUTION SUBCUTANEOUS at 11:00

## 2017-03-29 RX ADMIN — Medication SCH ML: at 09:00

## 2017-03-29 RX ADMIN — HUMAN INSULIN SCH: 100 INJECTION, SOLUTION SUBCUTANEOUS at 04:55

## 2017-03-29 RX ADMIN — HUMAN INSULIN SCH: 100 INJECTION, SOLUTION SUBCUTANEOUS at 23:00

## 2017-03-29 RX ADMIN — IPRATROPIUM BROMIDE AND ALBUTEROL SULFATE SCH AMPULE: .5; 3 SOLUTION RESPIRATORY (INHALATION) at 07:50

## 2017-03-29 RX ADMIN — IPRATROPIUM BROMIDE AND ALBUTEROL SULFATE SCH AMPULE: .5; 3 SOLUTION RESPIRATORY (INHALATION) at 16:12

## 2017-03-29 NOTE — PD.ONC.PN
Subjective


Subjective


Remarks


Afebrile overnight. Pt resting in bed in no distress. He states his breathing 

is improved. Denies chest pain. Per RN he has had some pink tinged urine to 

catheter bag.





Objective


Data











  Date Time  Temp Pulse Resp B/P Pulse Ox O2 Delivery O2 Flow Rate FiO2


 


3/29/17 10:00  83      


 


3/29/17 08:00 98.3 79 15 123/69 96   


 


3/29/17 08:00  79      


 


3/29/17 07:51     96 Nasal Cannula 2.00 


 


3/29/17 06:00  82      


 


3/29/17 04:00  81      


 


3/29/17 04:00 98.4 83 20 131/73 95   


 


3/29/17 02:00  81      


 


3/29/17 00:00  85      


 


3/29/17 00:00 98.7 85 20 137/66 95   


 


3/28/17 22:00  84      


 


3/28/17 20:00 97.9 88 20 128/61 94   


 


3/28/17 20:00  88      


 


3/28/17 19:43     95 Nasal Cannula 4.00 


 


3/28/17 18:00  80      


 


3/28/17 16:00  80      


 


3/28/17 16:00 98.6 95 20 120/73 96   


 


3/28/17 14:00  80      














 3/29/17 3/29/17 3/29/17





 07:00 15:00 23:00


 


Intake Total 435 ml  


 


Output Total 450 ml  


 


Balance -15 ml  








Result Diagram:  


3/29/17 0445                                                                   

             3/29/17 0445





Laboratory Results





Laboratory Tests








Test 3/28/17 3/28/17 3/29/17 3/29/17





 13:30 17:15 04:45 09:40


 


White Blood Count 14.7 TH/MM3  14.5 TH/MM3 


 


Red Blood Count 4.36 MIL/MM3  4.20 MIL/MM3 


 


Hemoglobin 12.8 GM/DL  12.0 GM/DL 


 


Hematocrit 38.7 %  37.7 % 


 


Mean Corpuscular Volume 88.9 FL  89.8 FL 


 


Mean Corpuscular Hemoglobin 29.3 PG  28.5 PG 


 


Mean Corpuscular Hemoglobin 33.0 %  31.7 % 





Concent    


 


Red Cell Distribution Width 14.9 %  14.4 % 


 


Platelet Count 247 TH/MM3  197 TH/MM3 


 


Mean Platelet Volume 7.4 FL  7.6 FL 


 


Neutrophils (%) (Auto) 66.6 %  67.1 % 


 


Lymphocytes (%) (Auto) 18.2 %  19.3 % 


 


Monocytes (%) (Auto) 12.8 %  11.6 % 


 


Eosinophils (%) (Auto) 1.4 %  1.7 % 


 


Basophils (%) (Auto) 1.0 %  0.3 % 


 


Neutrophils # (Auto) 9.8 TH/MM3  9.7 TH/MM3 


 


Lymphocytes # (Auto) 2.7 TH/MM3  2.8 TH/MM3 


 


Monocytes # (Auto) 1.9 TH/MM3  1.7 TH/MM3 


 


Eosinophils # (Auto) 0.2 TH/MM3  0.2 TH/MM3 


 


Basophils # (Auto) 0.1 TH/MM3  0.0 TH/MM3 


 


CBC Comment DIFF FINAL   DIFF FINAL  


 


Differential Comment      


 


Activated Partial 28.5 SEC 31.5 SEC  29.0 SEC





Thromboplast Time    


 


Fibrinogen 408 mg/dL   


 


Sodium Level   141 MEQ/L 


 


Potassium Level   3.6 MEQ/L 


 


Chloride Level   107 MEQ/L 


 


Carbon Dioxide Level   23.9 MEQ/L 


 


Anion Gap   10 MEQ/L 


 


Blood Urea Nitrogen   14 MG/DL 


 


Creatinine   1.00 MG/DL 


 


Estimat Glomerular Filtration   77 ML/MIN 





Rate    


 


Random Glucose   88 MG/DL 


 


Calcium Level   8.2 MG/DL 


 


Prothrombin Time    13.4 SEC


 


Prothromb Time International    1.2 RATIO





Ratio    








Culture Results





Microbiology








 Date/Time Procedure Status





Source Growth 


 


 3/27/17 18:20 Urine Culture - Final Complete





Urine Catheterized Urine NO GROWTH IN 48 HOURS. 











Administered Medications








 Medications


  (Trade)  Dose


 Ordered  Sig/Madie


 Route


 PRN Reason  Start Time


 Stop Time Status Last Admin


Dose Admin


 


 Pantoprazole


 Sodium


  (Protonix Inj)  40 mg  Q24H


 IV


   3/27/17 16:00


    3/27/17 16:00


 


 


 Chlorhexidine


 Gluconate


  (Chlorhexidine


 2% Cloth)  3 pack


 Taper  DAILY@04


 TOP


   3/28/17 04:00


 3/24/18 03:59  3/28/17 00:44


 


 


 Sodium Chloride


  (NS Flush)    DAILY


 IVF


   3/28/17 09:00


    3/29/17 09:00


 


 


 Docusate Sodium


  (Colace)  100 mg  BID


 PO


   3/29/17 09:00


    3/29/17 09:44


 








Objective Remarks


GENERAL: Obese older male, lying in bed in no distress.


SKIN: Warm and dry.


HEAD: Normocephalic.


EYES:  No injection or drainage. 


NECK: Supple, trachea midline. 


CARDIOVASCULAR: +S1/S2. 


RESPIRATORY: Breath sounds diminished anteriorly.


GASTROINTESTINAL: Obese. 


EXTREMITIES: No cyanosis, or edema. 


NEUROLOGICAL: No obvious focal deficit. Awake, alert, and oriented x3.





Assessment/Plan


Problem List:  


(1) Pulmonary embolus


Status:  Acute


Plan:  3/29/17: Doing better. Continue heparin gtt. Monitor for bleeding. Will 

start him on Eliquis once stable.


3/28/17: follow protocol for heparin gtt per invasive radiology s/p TPA. will 

start oral anticoagulation once more stable on medical floor.


leg U/S : DVT, left popliteal vein


--s/p TPA infusion


--will need to be on oral anticoagulation for at least a year





Assessment


55y/o male with massive bilateral pulmonary emboli


history of morbid obesity and hard of hearing.  


+ [facility] of DVT


Attending Statement


no new c/o


breathing has improved.


Continue heparin


Start eliquis when more stable.


The exam, history, and the medical decision-making described in the above note 

were completed with the assistance of the mid-level provider. I reviewed and 

agree with the findings presented.  I attest that I had a face-to-face 

encounter with the patient on the same day, and personally performed and 

documented my assessment and findings in the medical record.





Problem Qualifiers





(1) Pulmonary embolus:  


Qualified Code:  I26.09 - Other acute pulmonary embolism with acute cor 

pulmonale





Gabrielle Lugo Mar 29, 2017 12:31


Mike Roa MD Mar 29, 2017 18:08

## 2017-03-29 NOTE — HHI.CCPN
Subjective


Remarks/Hospital Course


The patient is a 56-year-old male with past medical history of morbid obesity 

who presented to [facility] ED with a one week history of


left-sided chest pain that radiates into his flank associated with shortness of 

breath for 4 days.  Last week he was diagnosed with pneumonia by his PCP and


started on Levaquin which he has been taking for the past 5 days. He denies any 

symptoms of edema of lower extremity or PND.  Also the patient denies any


nausea, vomiting or abdominal pain.  On arrival to the ER he was tachycardiac 

and hypoxemic with O2 saturation of 85%.  The patient was placed on 5 liters


nasal cannula with saturation improved to 95%.  Chest x-ray in the ER showed 

cardiomegaly with subsegmental atelectasis in the left lung base and small left 

pleural effusion.  Due to his respiratory distress CT angiogram of the chest 

was obtained which showed massive bilateral PE, right greater than left with


subpleural atelectatic changes in the left lower lobe. His laboratory data 

significant for Troponin of 0.56 and elevated BNP of 421. In the ER the patient 

was given heparin bolus and just started on heparin drip. Case discussed with 

Dr. Parrish  from interventional radiology, and plan to proceed with t-PA 

infusion at 2 mg an hour and heparin drip 500 units per hour. The patient 

denies prior history of pulmonary embolism or DVT.  However, he states that his 

brother had a DVT in the past.





3/28 Patient is awake and alert denies any worsening SOB. On TPA and heparin 

infusion. Awake and alert. On 4L oxygen.


3/29 the patient's resting comfortably without dyspnea on  O2 4L/m nasal 

cannula.  The patient's tPA was completed at 5 PM yesterday.  Upon review of MAR

, heparin infusion was also discontinued.  Hematology consulted to  resume 

heparin infusion, or transition to Eliquis at this time.  PTT/INR ordered this 

a.m..





Objective





 Vital Signs








  Date Time  Temp Pulse Resp B/P Pulse Ox O2 Delivery O2 Flow Rate FiO2


 


3/29/17 07:51     96 Nasal Cannula 2.00 


 


3/29/17 06:00  82      


 


3/29/17 04:00 98.4  20 131/73    








 Intake and Output








 3/28/17 3/28/17 3/29/17





 08:00 16:00 00:00


 


Intake Total 1140 ml 2003 ml 798 ml


 


Output Total 400 ml 1100 ml 650 ml


 


Balance 740 ml 903 ml 148 ml








Result Diagram:  


3/29/17 0445                                                                   

             3/29/17 0445





Imaging





Last Impressions








Chest X-Ray 3/27/17 1654 Signed





Impressions: 





 Service Date/Time:  Monday, March 27, 2017 17:16 - CONCLUSION:  Right IJ 

central 





 venous catheter with the tip projecting over the central venous system.     





 Urban Parrish MD 


 


CT Angiography 3/27/17 1303 Signed





Impressions: 





 Service Date/Time:  Monday, March 27, 2017 13:46 - CONCLUSION:  1. Massive 





 bilateral PE, right greater than left. 2. Subpleural atelectatic changes in 

the 





 left lower lobe and atelectatic changes paralleling the left major fissure, 





 likely related to the large clot burden in the pulmonary artery. 3. Results 

were 





 called to Dr. Watson in the ED at the time of this dictation     Urban Parrish MD 


 


Lower Extremity Ultrasound 3/27/17 0000 Signed





Impressions: 





 Service Date/Time:  Monday, March 27, 2017 15:42 - CONCLUSION: Nonocclusive 





 thrombus left popliteal vein     Ever Leal MD  FACR








Objective Remarks


GENERAL: Patient is 55 yo lying in bed, semi-recombinant in NAD


SKIN: Warm and dry.


HEAD: Normocephalic.


EYES: No scleral icterus. No injection or drainage. 


NECK: Supple, trachea midline. No JVD or lymphadenopathy.


CARDIOVASCULAR: Regular rate and rhythm without murmurs, gallops, or rubs. 


RESPIRATORY: Breath sounds equal bilaterally. No accessory muscle use.  O2 via 

nasal cannula


GASTROINTESTINAL: Abdomen soft, protuberant, non-tender, nondistended. 


MUSCULOSKELETAL: No cyanosis, or edema. 


BACK: Nontender without obvious deformity. No CVA tenderness.


Neuro: Awake and alert





Urinary Catheter:  Yes


Assessment to:  Continue


Bar insert reason:  Prolonged Immobilization


Vascular Central Line Catheter:  Yes


Assessment to:  Continue


Side:  Right


Location:  Internal, Jugular





A/P


Assessment and Plan


1. Respiratory failure.


2. Massive bilateral pulmonary embolism.


3. Morbid obesity.


4. Leukocytosis.


5. Elevated troponin likely 2nd PE


 


Plan





Neuro: Monitor neuro status and avoid any sedatives.





Pulm: Continue with oxygen and maintain sats > 92%.


      Bronchodilators 


      On T-PA infusion 2 mg/hr completed 3/28 at 5pm .Heparin drip was also 

discontinued last evening, reconsulted to hematology oncology regarding 

transition          therapeutic heparin infusion restarted this am and Eliquiss 

to be started per Dr. Roa.


 


CV: Monitor HR and BP keep MAP> 65 mmHg.


     3/28 echo results-ejection fraction 5560 percent no regional mole wall 

motion abnormality





: Monitor renal function Is and Os and electrolyte replacement as needed.


      IV Hep-Lock





GI: on Protonix 40 mg IV daily for GI prophylaxis.


      Bowel regimen added





ID: Monitor for signs of infections ( fever and WBC) Will panculture if spikes 

fever.





Endo:SSI with Accu-Cheks if needed for glycemic control.





Heme: Monitor CBC and coags 


          Follow up on hypercoagulable profile. Hematology is following- Dr. Roa





GI prophylaxis with Protonix 40 mg daily and DVT prophylaxis on heparin drip.


Doppler US LE :non-occlusive thrombus left popliteal vein.


MSK: Strict bedrest





Level 3


Dispo: I discussed patient with  and ICU RN at bedside


Physician


Ping Modi MD Mar 29, 2017 08:58

## 2017-03-29 NOTE — EKG
Date Performed: 03/27/2017       Time Performed: 13:21:07

 

PTAGE:      56 years

 

EKG:      SINUS TACHYCARDIA LOW QRS VOLTAGE IN PRECORDIAL LEADS PATTERN CONSISTENT WITH PULMONARY DIS
EASE PROLONGATION OF THE QT INTERVAL FOR THE HEART RATE ABNORMAL ECG 

 

NO PREVIOUS TRACING            

 

DOCTOR:   Amy Reese  Interpretating Date/Time  03/29/2017 07:58:21

## 2017-03-30 VITALS
DIASTOLIC BLOOD PRESSURE: 70 MMHG | OXYGEN SATURATION: 94 % | RESPIRATION RATE: 26 BRPM | HEART RATE: 85 BPM | SYSTOLIC BLOOD PRESSURE: 126 MMHG | TEMPERATURE: 97.7 F

## 2017-03-30 VITALS
OXYGEN SATURATION: 96 % | HEART RATE: 79 BPM | DIASTOLIC BLOOD PRESSURE: 86 MMHG | SYSTOLIC BLOOD PRESSURE: 163 MMHG | TEMPERATURE: 97.9 F | RESPIRATION RATE: 28 BRPM

## 2017-03-30 VITALS
SYSTOLIC BLOOD PRESSURE: 133 MMHG | TEMPERATURE: 99.1 F | OXYGEN SATURATION: 96 % | RESPIRATION RATE: 28 BRPM | DIASTOLIC BLOOD PRESSURE: 65 MMHG | HEART RATE: 79 BPM

## 2017-03-30 VITALS
OXYGEN SATURATION: 95 % | DIASTOLIC BLOOD PRESSURE: 77 MMHG | SYSTOLIC BLOOD PRESSURE: 146 MMHG | TEMPERATURE: 98.9 F | RESPIRATION RATE: 25 BRPM | HEART RATE: 82 BPM

## 2017-03-30 VITALS
DIASTOLIC BLOOD PRESSURE: 78 MMHG | RESPIRATION RATE: 20 BRPM | SYSTOLIC BLOOD PRESSURE: 141 MMHG | OXYGEN SATURATION: 96 % | TEMPERATURE: 97.9 F | HEART RATE: 77 BPM

## 2017-03-30 VITALS — HEART RATE: 89 BPM

## 2017-03-30 VITALS
TEMPERATURE: 98.1 F | RESPIRATION RATE: 20 BRPM | SYSTOLIC BLOOD PRESSURE: 107 MMHG | DIASTOLIC BLOOD PRESSURE: 56 MMHG | HEART RATE: 84 BPM | OXYGEN SATURATION: 94 %

## 2017-03-30 VITALS — HEART RATE: 74 BPM

## 2017-03-30 VITALS — HEART RATE: 86 BPM

## 2017-03-30 VITALS — HEART RATE: 95 BPM

## 2017-03-30 VITALS — OXYGEN SATURATION: 95 %

## 2017-03-30 VITALS — HEART RATE: 78 BPM

## 2017-03-30 VITALS — HEART RATE: 81 BPM

## 2017-03-30 VITALS — OXYGEN SATURATION: 97 %

## 2017-03-30 LAB
ANION GAP SERPL CALC-SCNC: 8 MEQ/L (ref 5–15)
APTT BLD: 36.5 SEC (ref 24.3–30.1)
BUN SERPL-MCNC: 12 MG/DL (ref 7–18)
CHLORIDE SERPL-SCNC: 106 MEQ/L (ref 98–107)
ERYTHROCYTE [DISTWIDTH] IN BLOOD BY AUTOMATED COUNT: 14.5 % (ref 11.6–17.2)
FACTOR V LEIDEN REVIEW: (no result)
GFR SERPLBLD BASED ON 1.73 SQ M-ARVRAT: 89 ML/MIN (ref 89–?)
HCO3 BLD-SCNC: 25.2 MEQ/L (ref 21–32)
HCT VFR BLD CALC: 37 % (ref 39–51)
INR PPP: 1.1 RATIO
MAGNESIUM SERPL-MCNC: 2.3 MG/DL (ref 1.5–2.5)
MCH RBC QN AUTO: 28.2 PG (ref 27–34)
MCHC RBC AUTO-ENTMCNC: 31.7 % (ref 32–36)
MCV RBC AUTO: 88.9 FL (ref 80–100)
PLATELET # BLD: 206 TH/MM3 (ref 150–450)
POTASSIUM SERPL-SCNC: 3.8 MEQ/L (ref 3.5–5.1)
PROTHROMBIN TIME: 12.6 SEC (ref 9.8–11.6)
RBC # BLD AUTO: 4.16 MIL/MM3 (ref 4.5–5.9)
REVIEW FLAG: (no result)
SODIUM SERPL-SCNC: 139 MEQ/L (ref 136–145)
THROMBIN TIME FOR LA: 19 SEC (ref 13–19)
WBC # BLD AUTO: 13.5 TH/MM3 (ref 4–11)

## 2017-03-30 RX ADMIN — HUMAN INSULIN SCH: 100 INJECTION, SOLUTION SUBCUTANEOUS at 23:00

## 2017-03-30 RX ADMIN — IPRATROPIUM BROMIDE AND ALBUTEROL SULFATE SCH AMPULE: .5; 3 SOLUTION RESPIRATORY (INHALATION) at 19:47

## 2017-03-30 RX ADMIN — APIXABAN SCH MG: 5 TABLET, FILM COATED ORAL at 10:43

## 2017-03-30 RX ADMIN — WATER SCH ML: 1 IRRIGANT IRRIGATION at 21:00

## 2017-03-30 RX ADMIN — IPRATROPIUM BROMIDE AND ALBUTEROL SULFATE SCH AMPULE: .5; 3 SOLUTION RESPIRATORY (INHALATION) at 11:14

## 2017-03-30 RX ADMIN — IPRATROPIUM BROMIDE AND ALBUTEROL SULFATE SCH AMPULE: .5; 3 SOLUTION RESPIRATORY (INHALATION) at 08:17

## 2017-03-30 RX ADMIN — HUMAN INSULIN SCH: 100 INJECTION, SOLUTION SUBCUTANEOUS at 05:00

## 2017-03-30 RX ADMIN — DOCUSATE SODIUM SCH MG: 100 CAPSULE, LIQUID FILLED ORAL at 21:00

## 2017-03-30 RX ADMIN — IPRATROPIUM BROMIDE AND ALBUTEROL SULFATE SCH AMPULE: .5; 3 SOLUTION RESPIRATORY (INHALATION) at 03:16

## 2017-03-30 RX ADMIN — FAMOTIDINE SCH MG: 20 TABLET, FILM COATED ORAL at 21:00

## 2017-03-30 RX ADMIN — IPRATROPIUM BROMIDE AND ALBUTEROL SULFATE SCH AMPULE: .5; 3 SOLUTION RESPIRATORY (INHALATION) at 15:33

## 2017-03-30 RX ADMIN — STANDARDIZED SENNA CONCENTRATE AND DOCUSATE SODIUM SCH TAB: 8.6; 5 TABLET, FILM COATED ORAL at 21:00

## 2017-03-30 RX ADMIN — HUMAN INSULIN SCH: 100 INJECTION, SOLUTION SUBCUTANEOUS at 10:43

## 2017-03-30 RX ADMIN — STANDARDIZED SENNA CONCENTRATE AND DOCUSATE SODIUM SCH TAB: 8.6; 5 TABLET, FILM COATED ORAL at 12:56

## 2017-03-30 RX ADMIN — POLYETHYLENE GLYCOL 3350 SCH GM: 17 POWDER, FOR SOLUTION ORAL at 12:56

## 2017-03-30 RX ADMIN — CHLORHEXIDINE GLUCONATE SCH PACK: 500 CLOTH TOPICAL at 02:41

## 2017-03-30 RX ADMIN — Medication SCH ML: at 09:00

## 2017-03-30 RX ADMIN — POLYETHYLENE GLYCOL 3350 SCH GM: 17 POWDER, FOR SOLUTION ORAL at 21:00

## 2017-03-30 RX ADMIN — IPRATROPIUM BROMIDE AND ALBUTEROL SULFATE SCH AMPULE: .5; 3 SOLUTION RESPIRATORY (INHALATION) at 23:31

## 2017-03-30 RX ADMIN — APIXABAN SCH MG: 5 TABLET, FILM COATED ORAL at 22:40

## 2017-03-30 RX ADMIN — DOCUSATE SODIUM SCH MG: 100 CAPSULE, LIQUID FILLED ORAL at 09:04

## 2017-03-30 RX ADMIN — HUMAN INSULIN SCH: 100 INJECTION, SOLUTION SUBCUTANEOUS at 17:00

## 2017-03-30 NOTE — PD.ONC.PN
Subjective


Subjective


Remarks


Resting comfortably. He has order to be transferred out of the ICU today. He is 

looking forward to getting OOB.





Objective


Data











  Date Time  Temp Pulse Resp B/P Pulse Ox O2 Delivery O2 Flow Rate FiO2


 


3/30/17 12:00 97.9 79 28 163/86 96   


 


3/30/17 12:00  79      


 


3/30/17 10:00  81      


 


3/30/17 08:18     97 Nasal Cannula 2.00 


 


3/30/17 08:00 97.9 77 20 141/78 96   


 


3/30/17 08:00  77      


 


3/30/17 06:00  78      


 


3/30/17 04:00 98.9 82 25 146/77 95   


 


3/30/17 04:00  82      


 


3/30/17 02:00  95      


 


3/30/17 00:00 99.1 79 28 133/65 96   


 


3/30/17 00:00  79      


 


3/29/17 22:00  83      


 


3/29/17 20:00     96 Nasal Cannula 2.00 


 


3/29/17 20:00 98.7 82 23 125/63 96   


 


3/29/17 20:00  82      


 


3/29/17 18:00  83      














 3/30/17 3/30/17 3/30/17





 07:00 15:00 23:00


 


Intake Total 261 ml  


 


Output Total 450 ml  


 


Balance -189 ml  








Result Diagram:  


3/30/17 0515                                                                   

             3/30/17 0515





Laboratory Results





Laboratory Tests








Test 3/29/17 3/30/17





 23:15 05:15


 


Activated Partial 36.2 SEC 36.5 SEC





Thromboplast Time  


 


White Blood Count  13.5 TH/MM3


 


Red Blood Count  4.16 MIL/MM3


 


Hemoglobin  11.7 GM/DL


 


Hematocrit  37.0 %


 


Mean Corpuscular Volume  88.9 FL


 


Mean Corpuscular Hemoglobin  28.2 PG


 


Mean Corpuscular Hemoglobin  31.7 %





Concent  


 


Red Cell Distribution Width  14.5 %


 


Platelet Count  206 TH/MM3


 


Mean Platelet Volume  7.6 FL


 


Prothrombin Time  12.6 SEC


 


Prothromb Time International  1.1 RATIO





Ratio  


 


Sodium Level  139 MEQ/L


 


Potassium Level  3.8 MEQ/L


 


Chloride Level  106 MEQ/L


 


Carbon Dioxide Level  25.2 MEQ/L


 


Anion Gap  8 MEQ/L


 


Blood Urea Nitrogen  12 MG/DL


 


Creatinine  0.88 MG/DL


 


Estimat Glomerular Filtration  89 ML/MIN





Rate  


 


Random Glucose  93 MG/DL


 


Calcium Level  8.0 MG/DL


 


Phosphorus Level  3.4 MG/DL


 


Magnesium Level  2.3 MG/DL








Culture Results





Microbiology








 Date/Time Procedure Status





Source Growth 


 


 3/27/17 18:20 Urine Culture - Final Complete





Urine Catheterized Urine NO GROWTH IN 48 HOURS. 











Administered Medications








 Medications


  (Trade)  Dose


 Ordered  Sig/Madie


 Route


 PRN Reason  Start Time


 Stop Time Status Last Admin


Dose Admin


 


 Chlorhexidine


 Gluconate


  (Chlorhexidine


 2% Cloth)  3 pack


 Taper  DAILY@04


 TOP


   3/28/17 04:00


 3/24/18 03:59  3/30/17 02:41


 


 


 Sodium Chloride


  (NS Flush)    DAILY


 IVF


   3/28/17 09:00


    3/30/17 09:00


 


 


 Docusate Sodium


  (Colace)  100 mg  BID


 PO


   3/29/17 09:00


    3/30/17 09:04


 


 


 Apixaban


  (Eliquis)  10 mg  BID


 PO


   3/30/17 09:00


 4/5/17 21:01  3/30/17 10:43


 


 


 Acetaminophen


  (Tylenol)  500 mg  Q6H  PRN


 PO


 pain 1-10 or fever  3/30/17 11:15


    3/30/17 11:34


 


 


 Polyethylene


 Glycol


  (Miralax)  17 gm  BID


 PO


   3/30/17 11:15


    3/30/17 12:56


 


 


 Senna/Docusate


 Sodium


  (Carlota-Colace)  1 tab  BID


 PO


   3/30/17 11:15


    3/30/17 12:56


 








Objective Remarks


GENERAL: Obese older male, lying in bed in no distress.


SKIN: Warm and dry.


HEAD: Normocephalic.


EYES:  No injection or drainage. 


NECK: Supple, trachea midline. 


CARDIOVASCULAR: +S1/S2. 


RESPIRATORY: Breath sounds diminished anteriorly.


GASTROINTESTINAL: Obese. + BS.


EXTREMITIES: No cyanosis, or edema. 


NEUROLOGICAL: No obvious focal deficit. Awake, alert, and oriented x3.





Assessment/Plan


Problem List:  


(1) Pulmonary embolus


Status:  Acute


Plan:  3/30/17: Heparin D/C'd. Started on Eliquis. He will remain on 10mg BID x 

7 days then he will be decreased to 5mg BID. We are planning to keep him on 

this medication for one year. 


3/29/17: Doing better. Continue heparin gtt. Monitor for bleeding. Will start 

him on Eliquis once stable.


3/28/17: follow protocol for heparin gtt per invasive radiology s/p TPA. will 

start oral anticoagulation once more stable on medical floor.


leg U/S : DVT, left popliteal vein


--s/p TPA infusion


--will need to be on oral anticoagulation for at least a year





Assessment


55y/o male with massive bilateral pulmonary emboli


history of morbid obesity and hard of hearing.  


+ [facility] of DVT


Attending Statement


no new c/o


Heparin stopped.


Eliquis 10 mg BID x 1 wek and then 5 mg PO BID 


Ok to d/c tomorrow if remains stable.





Problem Qualifiers





(1) Pulmonary embolus:  


Qualified Code:  I26.09 - Other acute pulmonary embolism with acute cor 

pulmonale





Gabrielle Lugo Mar 30, 2017 17:31


Mike Roa MD Mar 31, 2017 01:05

## 2017-03-30 NOTE — RADRPT
EXAM DATE/TIME:  03/30/2017 21:06 

 

[facility] COMPARISON:     

No previous studies available for comparison.

 

 

INDICATIONS :     

Cephalgia status-post systemic TPA.

                      

 

RADIATION DOSE:     

56.35 CTDIvol (mGy) 

 

 

 

MEDICAL HISTORY :      

Cardiovascular disease 

 

SURGICAL HISTORY :      

None. 

 

ENCOUNTER:      

Initial

 

ACUITY:      

1 day

 

PAIN SCALE:      

3/10

 

LOCATION:        

cranial 

 

TECHNIQUE:     

Multiple contiguous axial images were obtained of the head.  Using automated exposure control and adj
ustment of the mA and/or kV according to patient size, radiation dose was kept as low as reasonably a
chievable to obtain optimal diagnostic quality images. 

 

FINDINGS:     

 

CEREBRUM:     

The ventricles are normal for age.  No evidence of midline shift, mass lesion, hemorrhage or acute in
farction.  No extra-axial fluid collections are seen.

 

POSTERIOR FOSSA:     

The cerebellum and brainstem are intact.  The 4th ventricle is midline.  The cerebellopontine angle i
s unremarkable.

 

EXTRACRANIAL:     

The visualized portion of the orbits is intact.

 

SKULL:     

The calvaria is intact.  No evidence of skull fracture.

 

CONCLUSION:     

No acute disease.  

 

 

 

 Noé Robledo MD on March 30, 2017 at 21:28           

Board Certified Radiologist.

 This report was verified electronically.

## 2017-03-30 NOTE — HHI.CCPN
Subjective


Remarks/Hospital Course


The patient is a 56-year-old male with past medical history of morbid obesity 

who presented to [facility] ED with a one week history of


left-sided chest pain that radiates into his flank associated with shortness of 

breath for 4 days.  Last week he was diagnosed with pneumonia by his PCP and


started on Levaquin which he has been taking for the past 5 days. He denies any 

symptoms of edema of lower extremity or PND.  Also the patient denies any


nausea, vomiting or abdominal pain.  On arrival to the ER he was tachycardiac 

and hypoxemic with O2 saturation of 85%.  The patient was placed on 5 liters


nasal cannula with saturation improved to 95%.  Chest x-ray in the ER showed 

cardiomegaly with subsegmental atelectasis in the left lung base and small left 

pleural effusion.  Due to his respiratory distress CT angiogram of the chest 

was obtained which showed massive bilateral PE, right greater than left with


subpleural atelectatic changes in the left lower lobe. His laboratory data 

significant for Troponin of 0.56 and elevated BNP of 421. In the ER the patient 

was given heparin bolus and just started on heparin drip. Case discussed with 

Dr. Parrish  from interventional radiology, and plan to proceed with t-PA 

infusion at 2 mg an hour and heparin drip 500 units per hour. The patient 

denies prior history of pulmonary embolism or DVT.  However, he states that his 

brother had a DVT in the past.





3/28 Patient is awake and alert denies any worsening SOB. On TPA and heparin 

infusion. Awake and alert. On 4L oxygen.


3/29 the patient's resting comfortably without dyspnea on  O2 4L/m nasal 

cannula.  The patient's tPA was completed at 5 PM yesterday.  Upon review of MAR

, heparin infusion was also discontinued.  Hematology consulted to  resume 

heparin infusion, or transition to Eliquis at this time.  PTT/INR ordered this 

a.m..





3/30: complaining of headache this morning. otherwise remains hemodynamically 

stable. also endorses some nausea today.





Objective





 Vital Signs








  Date Time  Temp Pulse Resp B/P Pulse Ox O2 Delivery O2 Flow Rate FiO2


 


3/30/17 10:00  81      


 


3/30/17 08:18     97 Nasal Cannula 2.00 


 


3/30/17 08:00 97.9  20 141/78    








 Intake and Output








 3/29/17 3/29/17 3/30/17





 08:00 16:00 00:00


 


Intake Total 435 ml 577 ml 400 ml


 


Output Total 450 ml 525 ml 550 ml


 


Balance -15 ml 52 ml -150 ml








Result Diagram:  


3/30/17 0515                                                                   

             3/30/17 0515





Other Results





Microbiology








 Date/Time Procedure Status





Source Growth 


 


 3/27/17 18:20 Urine Culture - Final Complete





Urine Catheterized Urine NO GROWTH IN 48 HOURS. 








Imaging





Last Impressions








Chest X-Ray 3/27/17 1654 Signed





Impressions: 





 Service Date/Time:  Monday, March 27, 2017 17:16 - CONCLUSION:  Right IJ 

central 





 venous catheter with the tip projecting over the central venous system.     





 Urban Parrish MD 


 


CT Angiography 3/27/17 1303 Signed





Impressions: 





 Service Date/Time:  Monday, March 27, 2017 13:46 - CONCLUSION:  1. Massive 





 bilateral PE, right greater than left. 2. Subpleural atelectatic changes in 

the 





 left lower lobe and atelectatic changes paralleling the left major fissure, 





 likely related to the large clot burden in the pulmonary artery. 3. Results 

were 





 called to Dr. Watson in the ED at the time of this dictation     Urban Parrish MD 


 


Lower Extremity Ultrasound 3/27/17 0000 Signed





Impressions: 





 Service Date/Time:  Monday, March 27, 2017 15:42 - CONCLUSION: Nonocclusive 





 thrombus left popliteal vein     Ever Leal MD  FACR








Objective Remarks


GENERAL: Patient is 55 yo morbidly obese male lying in bed, semi-recombinant in 

NAD


SKIN: Warm and dry.


HEAD: Normocephalic.


EYES: No scleral icterus. No injection or drainage. 


NECK: trachea midline. JVD unable to be assessed due to obesity.


CARDIOVASCULAR: Regular rate and rhythm without murmurs, gallops, or rubs. 


RESPIRATORY: Breath sounds equal bilaterally. No accessory muscle use.  O2 via 

nasal cannula


GASTROINTESTINAL: Abdomen soft, protuberant, non-tender, nondistended. 


MUSCULOSKELETAL: No cyanosis, or edema. 


BACK: Nontender without obvious deformity


Neuro: Awake and alert





Side:  Right


Location:  Internal, Jugular





A/P


Assessment and Plan


1. Respiratory failure.


2. Massive bilateral pulmonary embolism.


3. Morbid obesity.


4. Leukocytosis.


5. Elevated troponin likely 2nd PE


 


Plan





Neuro: Monitor neuro status and avoid any sedatives.





Pulm: Continue with oxygen and maintain sats > 92%.


      Bronchodilators 


     


 


CV: Monitor HR and BP keep MAP> 65 mmHg.


     3/28 echo results-ejection fraction 5560 percent no regional wall motion 

abnormality


   start elequis per Heme





: Monitor renal function Is and Os and electrolyte replacement as needed.


      IV Hep-Lock


   d/c mejía


   lasix 20mg iv x 1


   kcl 20meq po x 1





GI: d/c iv protonix, start po pepcid.


      Bowel regimen added





ID: Monitor for signs of infections ( fever and WBC) Will panculture if spikes 

fever.





Endo:SSI with Accu-Cheks if needed for glycemic control.





Heme: Monitor CBC and coags 


          Follow up on hypercoagulable profile. Hematology is following- Dr. Roa


   start eliquis. d/c heparin drip.





GI prophylaxis with pepcid, DVT therapy with Eliquis.


Doppler US LE :non-occlusive thrombus left popliteal vein.


MSK: OOB with assist. PT consult.





d/c central line. obtain 2 piv.





Dispo: stable for transfer to the floor today with hospitalist following.








Scott Lara MD Mar 30, 2017 11:05

## 2017-03-31 VITALS
OXYGEN SATURATION: 94 % | SYSTOLIC BLOOD PRESSURE: 157 MMHG | RESPIRATION RATE: 20 BRPM | DIASTOLIC BLOOD PRESSURE: 67 MMHG | TEMPERATURE: 98.1 F | HEART RATE: 90 BPM

## 2017-03-31 VITALS
SYSTOLIC BLOOD PRESSURE: 150 MMHG | HEART RATE: 85 BPM | OXYGEN SATURATION: 95 % | RESPIRATION RATE: 27 BRPM | DIASTOLIC BLOOD PRESSURE: 83 MMHG | TEMPERATURE: 98 F

## 2017-03-31 VITALS
TEMPERATURE: 98.3 F | OXYGEN SATURATION: 94 % | SYSTOLIC BLOOD PRESSURE: 141 MMHG | HEART RATE: 83 BPM | DIASTOLIC BLOOD PRESSURE: 79 MMHG | RESPIRATION RATE: 25 BRPM

## 2017-03-31 VITALS — OXYGEN SATURATION: 94 %

## 2017-03-31 VITALS
OXYGEN SATURATION: 93 % | TEMPERATURE: 98.5 F | RESPIRATION RATE: 32 BRPM | SYSTOLIC BLOOD PRESSURE: 118 MMHG | HEART RATE: 87 BPM | DIASTOLIC BLOOD PRESSURE: 70 MMHG

## 2017-03-31 VITALS — OXYGEN SATURATION: 95 %

## 2017-03-31 VITALS
TEMPERATURE: 98.4 F | SYSTOLIC BLOOD PRESSURE: 145 MMHG | OXYGEN SATURATION: 95 % | HEART RATE: 92 BPM | DIASTOLIC BLOOD PRESSURE: 66 MMHG | RESPIRATION RATE: 20 BRPM

## 2017-03-31 VITALS
RESPIRATION RATE: 22 BRPM | TEMPERATURE: 97.9 F | HEART RATE: 83 BPM | SYSTOLIC BLOOD PRESSURE: 123 MMHG | DIASTOLIC BLOOD PRESSURE: 64 MMHG | OXYGEN SATURATION: 95 %

## 2017-03-31 VITALS — OXYGEN SATURATION: 96 %

## 2017-03-31 VITALS — HEART RATE: 82 BPM

## 2017-03-31 VITALS — HEART RATE: 90 BPM

## 2017-03-31 LAB
ANION GAP SERPL CALC-SCNC: 9 MEQ/L (ref 5–15)
BUN SERPL-MCNC: 14 MG/DL (ref 7–18)
CHLORIDE SERPL-SCNC: 106 MEQ/L (ref 98–107)
ERYTHROCYTE [DISTWIDTH] IN BLOOD BY AUTOMATED COUNT: 14.4 % (ref 11.6–17.2)
GFR SERPLBLD BASED ON 1.73 SQ M-ARVRAT: 88 ML/MIN (ref 89–?)
HCO3 BLD-SCNC: 25.1 MEQ/L (ref 21–32)
HCT VFR BLD CALC: 39.1 % (ref 39–51)
MCH RBC QN AUTO: 29 PG (ref 27–34)
MCHC RBC AUTO-ENTMCNC: 33.2 % (ref 32–36)
MCV RBC AUTO: 87.5 FL (ref 80–100)
PLATELET # BLD: 244 TH/MM3 (ref 150–450)
POTASSIUM SERPL-SCNC: 3.7 MEQ/L (ref 3.5–5.1)
RBC # BLD AUTO: 4.47 MIL/MM3 (ref 4.5–5.9)
REVIEW FLAG: (no result)
SODIUM SERPL-SCNC: 140 MEQ/L (ref 136–145)
WBC # BLD AUTO: 12.6 TH/MM3 (ref 4–11)

## 2017-03-31 RX ADMIN — IPRATROPIUM BROMIDE AND ALBUTEROL SULFATE SCH AMPULE: .5; 3 SOLUTION RESPIRATORY (INHALATION) at 19:24

## 2017-03-31 RX ADMIN — POLYETHYLENE GLYCOL 3350 SCH GM: 17 POWDER, FOR SOLUTION ORAL at 21:00

## 2017-03-31 RX ADMIN — STANDARDIZED SENNA CONCENTRATE AND DOCUSATE SODIUM SCH TAB: 8.6; 5 TABLET, FILM COATED ORAL at 09:00

## 2017-03-31 RX ADMIN — HUMAN INSULIN SCH: 100 INJECTION, SOLUTION SUBCUTANEOUS at 05:00

## 2017-03-31 RX ADMIN — IPRATROPIUM BROMIDE AND ALBUTEROL SULFATE SCH AMPULE: .5; 3 SOLUTION RESPIRATORY (INHALATION) at 15:04

## 2017-03-31 RX ADMIN — POLYETHYLENE GLYCOL 3350 SCH GM: 17 POWDER, FOR SOLUTION ORAL at 09:00

## 2017-03-31 RX ADMIN — CHLORHEXIDINE GLUCONATE SCH PACK: 500 CLOTH TOPICAL at 04:00

## 2017-03-31 RX ADMIN — Medication SCH ML: at 09:00

## 2017-03-31 RX ADMIN — FAMOTIDINE SCH MG: 20 TABLET, FILM COATED ORAL at 21:52

## 2017-03-31 RX ADMIN — IPRATROPIUM BROMIDE AND ALBUTEROL SULFATE SCH AMPULE: .5; 3 SOLUTION RESPIRATORY (INHALATION) at 07:33

## 2017-03-31 RX ADMIN — WATER SCH ML: 1 IRRIGANT IRRIGATION at 21:44

## 2017-03-31 RX ADMIN — APIXABAN SCH MG: 5 TABLET, FILM COATED ORAL at 21:44

## 2017-03-31 RX ADMIN — DOCUSATE SODIUM SCH MG: 100 CAPSULE, LIQUID FILLED ORAL at 21:00

## 2017-03-31 RX ADMIN — BISACODYL SCH MG: 10 SUPPOSITORY RECTAL at 09:00

## 2017-03-31 RX ADMIN — IPRATROPIUM BROMIDE AND ALBUTEROL SULFATE SCH AMPULE: .5; 3 SOLUTION RESPIRATORY (INHALATION) at 03:24

## 2017-03-31 RX ADMIN — HUMAN INSULIN SCH: 100 INJECTION, SOLUTION SUBCUTANEOUS at 11:00

## 2017-03-31 RX ADMIN — STANDARDIZED SENNA CONCENTRATE AND DOCUSATE SODIUM SCH TAB: 8.6; 5 TABLET, FILM COATED ORAL at 21:00

## 2017-03-31 RX ADMIN — APIXABAN SCH MG: 5 TABLET, FILM COATED ORAL at 09:08

## 2017-03-31 RX ADMIN — IPRATROPIUM BROMIDE AND ALBUTEROL SULFATE SCH AMPULE: .5; 3 SOLUTION RESPIRATORY (INHALATION) at 11:52

## 2017-03-31 RX ADMIN — DOCUSATE SODIUM SCH MG: 100 CAPSULE, LIQUID FILLED ORAL at 09:08

## 2017-03-31 RX ADMIN — HUMAN INSULIN SCH: 100 INJECTION, SOLUTION SUBCUTANEOUS at 16:43

## 2017-03-31 RX ADMIN — HUMAN INSULIN SCH: 100 INJECTION, SOLUTION SUBCUTANEOUS at 21:56

## 2017-03-31 RX ADMIN — WATER SCH ML: 1 IRRIGANT IRRIGATION at 09:00

## 2017-03-31 RX ADMIN — FAMOTIDINE SCH MG: 20 TABLET, FILM COATED ORAL at 09:07

## 2017-03-31 NOTE — HHI.PR
Subjective


Remarks


Pt reports that he generalized weakness.


He is on 2L via NC


Pt failed walk test today.





Objective


Vitals





 Vital Signs








  Date Time  Temp Pulse Resp B/P Pulse Ox O2 Delivery O2 Flow Rate FiO2


 


3/31/17 12:00 98.5 87 32 118/70 93   


 


3/31/17 12:00  87      


 


3/31/17 08:00  83      


 


3/31/17 08:00 98.3 83 25 141/79 94   


 


3/31/17 07:34     95 Nasal Cannula 2.00 


 


3/31/17 06:00  82      


 


3/31/17 04:00  85      


 


3/31/17 04:00 98.0 85 27 150/83 95   


 


3/31/17 02:09   20     


 


3/31/17 02:00  82      


 


3/31/17 00:00 97.9 83 22 123/64 95   


 


3/31/17 00:00  83      


 


3/30/17 22:00  89      


 


3/30/17 20:00  84      


 


3/30/17 20:00 98.1 84 20 107/56 94   


 


3/30/17 19:49     95 Nasal Cannula 2.00 


 


3/30/17 18:00  74      


 


3/30/17 16:00  85      


 


3/30/17 16:00 97.7 85 26 126/70 94   














 3/30/17 3/30/17 3/31/17





 15:00 23:00 07:00


 


Intake Total 264 ml 120 ml 240 ml


 


Output Total 2700 ml  300 ml


 


Balance -2436 ml 120 ml -60 ml


 


   


 


Intake Oral 150 ml 120 ml 240 ml


 


IV Total 114 ml 0 ml 0 ml


 


Output Urine Total 2700 ml  300 ml


 


# Voids  1 


 


# Bowel Movements 2 0 0








Result Diagram:  


3/31/17 0432                                                                   

             3/31/17 0432





Other Results





 Laboratory Tests








Test 3/29/17 3/29/17 3/30/17 3/31/17





 16:00 23:15 05:15 04:32


 


Activated Partial 36.9 SEC 36.2 SEC 36.5 SEC 





Thromboplast Time    


 


White Blood Count   13.5 TH/MM3 12.6 TH/MM3


 


Red Blood Count   4.16 MIL/MM3 4.47 MIL/MM3


 


Hemoglobin   11.7 GM/DL 13.0 GM/DL


 


Hematocrit   37.0 % 39.1 %


 


Mean Corpuscular Volume   88.9 FL 87.5 FL


 


Mean Corpuscular Hemoglobin   28.2 PG 29.0 PG


 


Mean Corpuscular Hemoglobin   31.7 % 33.2 %





Concent    


 


Red Cell Distribution Width   14.5 % 14.4 %


 


Platelet Count   206 TH/MM3 244 TH/MM3


 


Mean Platelet Volume   7.6 FL 7.8 FL


 


Prothrombin Time   12.6 SEC 


 


Prothromb Time International   1.1 RATIO 





Ratio    


 


Sodium Level   139 MEQ/L 140 MEQ/L


 


Potassium Level   3.8 MEQ/L 3.7 MEQ/L


 


Chloride Level   106 MEQ/L 106 MEQ/L


 


Carbon Dioxide Level   25.2 MEQ/L 25.1 MEQ/L


 


Anion Gap   8 MEQ/L 9 MEQ/L


 


Blood Urea Nitrogen   12 MG/DL 14 MG/DL


 


Creatinine   0.88 MG/DL 0.89 MG/DL


 


Estimat Glomerular Filtration   89 ML/MIN 88 ML/MIN





Rate    


 


Random Glucose   93 MG/ MG/DL


 


Calcium Level   8.0 MG/DL 8.3 MG/DL


 


Phosphorus Level   3.4 MG/DL 


 


Magnesium Level   2.3 MG/DL 








Imaging





Last Impressions








Head CT 3/30/17 0000 Signed





Impressions: 





 Service Date/Time:  Thursday, March 30, 2017 21:06 - CONCLUSION:  No acute 





 disease.       Noé Robledo MD 


 


Chest X-Ray 3/27/17 1654 Signed





Impressions: 





 Service Date/Time:  Monday, March 27, 2017 17:16 - CONCLUSION:  Right IJ 

central 





 venous catheter with the tip projecting over the central venous system.     





 Urban Parrish MD 


 


CT Angiography 3/27/17 1303 Signed





Impressions: 





 Service Date/Time:  Monday, March 27, 2017 13:46 - CONCLUSION:  1. Massive 





 bilateral PE, right greater than left. 2. Subpleural atelectatic changes in 

the 





 left lower lobe and atelectatic changes paralleling the left major fissure, 





 likely related to the large clot burden in the pulmonary artery. 3. Results 

were 





 called to Dr. Watson in the ED at the time of this dictation     Urban Parrish MD 


 


Lower Extremity Ultrasound 3/27/17 0000 Signed





Impressions: 





 Service Date/Time:  Monday, March 27, 2017 15:42 - CONCLUSION: Nonocclusive 





 thrombus left popliteal vein     Ever Leal MD  FACR


 


Central Venous Line 3/27/17 0000 Signed





Impressions: 





 Service Date/Time:  Monday, March 27, 2017 00:00 - CONCLUSION:  1. 

Uncomplicated 





 line placement as above.  The procedure was performed in the patient's 

stretcher 





 due to table weight limitations. 2. Access was obtained to facilitate 





 administration of IV TPA for large volume pulmonary embolus. Infusion was 





 initiated in the IR department.     Urban Parrish MD 








Objective Remarks


General: morbidly obese male in NAD, AAOx3


Chest: CTA anteriorly


Cardiac: Regular


Abd: +BS, soft, obese, nontender


Ext: No pitting edema


Side:  Right


Location:  Internal, Jugular





A/P


Problem List:  


(1) Pulmonary embolus


Status:  Acute


Plan:  


- Patient is a 55 y/o morbidly obese male who presented to the ED with a one 

week history of left-sided chest pain that radiated into his flank 


 and SOB. The pt had been diagnosed with pneumonia by his PCP and had been 

started on Levaquin which he had taken for 5 days prior to admission. 


- On arrival to the ER he was tachycardiac and hypoxemic with O2 saturation of 

85% and was placed on 5 liters via NC with improvement


- CXR in the ED showed cardiomegaly with subsegmental atelectasis in the left 

lung base and small left pleural effusion.  


- CTA of the chest was obtained which showed massive bilateral PE, right 

greater than left with subpleural atelectatic changes in the left lower lobe. 


- In the ED the patient was given heparin bolus and just started on heparin 

drip and was admitted to [facility]. 


- IR was consulted and the patient was given t-PA infusion at 2 mg an hour and 

heparin drip 500 units per hour. 


- The tPA was completed at 5 PM on 3/28.  


- Heparin was D/C'd on 3/30


- Hematology was consulted and he was transitioned to Eliquis on 3/30.


- He will remain on Eliquis 10mg BID x 7 days (until 4/5/17) then he will be 

decreased to 5mg BID (on 4/6/17).


- Pt failed walk test today. 


- He only ambulated 15' with PT today


- Anticipate discharge tomorrow, either with HHC/PT and supplemental O2 or to 

rehab. Pt would like to consider these options tonight. 


 This was discussed with CM. 





(2) Morbid obesity


Status:  Chronic


Assessment and Plan





Patient examined.


Assessment and plan formulated with Kelle Lockwood PA-C.


I agree with the above.





Problem Qualifiers





(1) Pulmonary embolus:  


Qualified Code:  I26.09 - Other acute pulmonary embolism with acute cor 

pulmonale





Kelle Lockwood Mar 31, 2017 14:51


Erik Duggan DO Apr 1, 2017 14:36

## 2017-03-31 NOTE — PD.ONC.PN
Subjective


Subjective


Remarks


Afebrile overnight.  Patient resting comfortably with wife at bedside. He is 

tolerating Eliquis.





Objective


Data











  Date Time  Temp Pulse Resp B/P Pulse Ox O2 Delivery O2 Flow Rate FiO2


 


3/31/17 12:00 98.5 87 32 118/70 93   


 


3/31/17 12:00  87      


 


3/31/17 08:00  83      


 


3/31/17 08:00 98.3 83 25 141/79 94   


 


3/31/17 07:34     95 Nasal Cannula 2.00 


 


3/31/17 06:00  82      


 


3/31/17 04:00  85      


 


3/31/17 04:00 98.0 85 27 150/83 95   


 


3/31/17 02:09   20     


 


3/31/17 02:00  82      


 


3/31/17 00:00 97.9 83 22 123/64 95   


 


3/31/17 00:00  83      


 


3/30/17 22:00  89      


 


3/30/17 20:00  84      


 


3/30/17 20:00 98.1 84 20 107/56 94   


 


3/30/17 19:49     95 Nasal Cannula 2.00 


 


3/30/17 18:00  74      


 


3/30/17 16:00  85      


 


3/30/17 16:00 97.7 85 26 126/70 94   














 3/31/17 3/31/17 3/31/17





 07:00 15:00 23:00


 


Intake Total 240 ml  


 


Output Total 300 ml  


 


Balance -60 ml  








Result Diagram:  


3/31/17 0432                                                                   

             3/31/17 0432





Laboratory Results





Laboratory Tests








Test 3/31/17





 04:32


 


White Blood Count 12.6 TH/MM3


 


Red Blood Count 4.47 MIL/MM3


 


Hemoglobin 13.0 GM/DL


 


Hematocrit 39.1 %


 


Mean Corpuscular Volume 87.5 FL


 


Mean Corpuscular Hemoglobin 29.0 PG


 


Mean Corpuscular Hemoglobin 33.2 %





Concent 


 


Red Cell Distribution Width 14.4 %


 


Platelet Count 244 TH/MM3


 


Mean Platelet Volume 7.8 FL


 


Sodium Level 140 MEQ/L


 


Potassium Level 3.7 MEQ/L


 


Chloride Level 106 MEQ/L


 


Carbon Dioxide Level 25.1 MEQ/L


 


Anion Gap 9 MEQ/L


 


Blood Urea Nitrogen 14 MG/DL


 


Creatinine 0.89 MG/DL


 


Estimat Glomerular Filtration 88 ML/MIN





Rate 


 


Random Glucose 100 MG/DL


 


Calcium Level 8.3 MG/DL











Administered Medications








 Medications


  (Trade)  Dose


 Ordered  Sig/Madie


 Route


 PRN Reason  Start Time


 Stop Time Status Last Admin


Dose Admin


 


 Chlorhexidine


 Gluconate


  (Chlorhexidine


 2% Cloth)  3 pack


 Taper  DAILY@04


 TOP


   3/28/17 04:00


 3/24/18 03:59  3/31/17 04:00


 


 


 Sodium Chloride


  (NS Flush)    DAILY


 IVF


   3/28/17 09:00


    3/30/17 09:00


 


 


 Sodium Chloride


  (NS Flush)    UNSCH  PRN


 IVF


 SEE PROTOCOL  3/27/17 17:30


    3/31/17 09:09


 


 


 Docusate Sodium


  (Colace)  100 mg  BID


 PO


   3/29/17 09:00


    3/31/17 09:08


 


 


 Apixaban


  (Eliquis)  10 mg  BID


 PO


   3/30/17 09:00


 4/5/17 21:01  3/31/17 09:08


 


 


 Famotidine


  (Pepcid)  20 mg  BID


 PO


   3/30/17 21:00


    3/31/17 09:07


 


 


 Acetaminophen


  (Tylenol)  500 mg  Q6H  PRN


 PO


 pain 1-10 or fever  3/30/17 11:15


    3/30/17 11:34


 


 


 Oxycodone HCl


  (Roxicodone)  5 mg  Q6H  PRN


 PO


 pain 1-7  3/30/17 11:15


    3/31/17 01:09


 


 


 Hydromorphone HCl


  (Dilaudid Pf Inj)  0.5 mg  Q4H  PRN


 IV PUSH


 pain 8-10 or not taking po  3/30/17 11:15


    3/30/17 18:23


 


 


 Polyethylene


 Glycol


  (Miralax)  17 gm  BID


 PO


   3/30/17 11:15


    3/30/17 12:56


 


 


 Senna/Docusate


 Sodium


  (Carlota-Colace)  1 tab  BID


 PO


   3/30/17 11:15


    3/30/17 12:56


 








Objective Remarks


GENERAL: Middle aged male, sitting up in bed in nad. 


SKIN: Warm and dry.


HEAD: Normocephalic.


EYES: No injection or drainage. 


NECK: Supple, trachea midline. 


CARDIOVASCULAR: Regular rate and rhythm


RESPIRATORY: Breath sounds equal bilaterally. No accessory muscle use.


GASTROINTESTINAL: Abdomen soft, non-tender, nondistended. 


EXTREMITIES: No cyanosis


NEUROLOGICAL: awake and alert, normal speech. moving extremities.





Assessment/Plan


Problem List:  


(1) Pulmonary embolus


Status:  Acute


Plan:  3/31/17: continue Eliquis. clear for discharge from hematology 

perspective.


3/30/17: Heparin D/C'd. Started on Eliquis. He will remain on 10mg BID x 7 days 

then he will be decreased to 5mg BID. We are planning to keep him on this 

medication for one year. 


3/29/17: Doing better. Continue heparin gtt. Monitor for bleeding. Will start 

him on Eliquis once stable.


3/28/17: follow protocol for heparin gtt per invasive radiology s/p TPA. will 

start oral anticoagulation once more stable on medical floor.


leg U/S : DVT, left popliteal vein


--s/p TPA infusion


--will need to be on oral anticoagulation for at least a year





Assessment


57y/o male with massive bilateral pulmonary emboli


history of morbid obesity and hard of hearing.  


+ FMH of DVT


Attending Statement


No CP or SOB


No problems with eliquis so far.


OK to d/c on eliquis .


Follow up as outpt.


The exam, history, and the medical decision-making described in the above note 

were completed with the assistance of the mid-level provider. I reviewed and 

agree with the findings presented.  I attest that I had a face-to-face 

encounter with the patient on the same day, and personally performed and 

documented my assessment and findings in the medical record.





Problem Qualifiers





(1) Pulmonary embolus:  


Qualified Code:  I26.09 - Other acute pulmonary embolism with acute cor 

pulmonale





Ioana Lopez Mar 31, 2017 14:11


Mike Roa MD Apr 1, 2017 22:56

## 2017-03-31 NOTE — HHI.FF
Face to Face Verification


Diagnosis:  


(1) Pulmonary embolus


(2) Morbid obesity


Physical Therapy


Order:  Evaluate and Treat, Improve ambulation, Strength and gait training





Home Health Nursing


Order:     Medical education


      Signs/symptoms of disease process


      Oxygen administration education


      Medication education-adverse effect


      Nursing assessment with vital signs








I have seen patient Steve Lagunas on 3/31/17. My clinical findings support 

the need for the requested home health care services because:


Ltd mobility - disease progression


Patient has SOB


Deconditioned w/ increased weakness


Med compliance is questionable


Limited ability to care for self


Need for psychosocial assistance








I certify that my clinical findings support that this patient is homebound 

because:


Impaired cognitive ability/safety


Unsteady gait/balance


Unsafe to leave home unassisted


Need for psychosocial assistance


Unable to use public transportation








Erik Duggan DO Mar 31, 2017 15:17

## 2017-04-01 VITALS
OXYGEN SATURATION: 92 % | DIASTOLIC BLOOD PRESSURE: 73 MMHG | RESPIRATION RATE: 20 BRPM | HEART RATE: 89 BPM | SYSTOLIC BLOOD PRESSURE: 158 MMHG | TEMPERATURE: 98.2 F

## 2017-04-01 VITALS
OXYGEN SATURATION: 96 % | SYSTOLIC BLOOD PRESSURE: 135 MMHG | DIASTOLIC BLOOD PRESSURE: 60 MMHG | TEMPERATURE: 97.8 F | HEART RATE: 80 BPM | RESPIRATION RATE: 19 BRPM

## 2017-04-01 VITALS
RESPIRATION RATE: 21 BRPM | TEMPERATURE: 98.1 F | HEART RATE: 80 BPM | DIASTOLIC BLOOD PRESSURE: 61 MMHG | OXYGEN SATURATION: 94 % | SYSTOLIC BLOOD PRESSURE: 135 MMHG

## 2017-04-01 VITALS — OXYGEN SATURATION: 92 %

## 2017-04-01 VITALS
HEART RATE: 81 BPM | OXYGEN SATURATION: 93 % | TEMPERATURE: 97.8 F | SYSTOLIC BLOOD PRESSURE: 125 MMHG | RESPIRATION RATE: 17 BRPM | DIASTOLIC BLOOD PRESSURE: 63 MMHG

## 2017-04-01 LAB
ANION GAP SERPL CALC-SCNC: 7 MEQ/L (ref 5–15)
BUN SERPL-MCNC: 16 MG/DL (ref 7–18)
CHLORIDE SERPL-SCNC: 106 MEQ/L (ref 98–107)
ERYTHROCYTE [DISTWIDTH] IN BLOOD BY AUTOMATED COUNT: 14.5 % (ref 11.6–17.2)
GFR SERPLBLD BASED ON 1.73 SQ M-ARVRAT: 100 ML/MIN (ref 89–?)
HCO3 BLD-SCNC: 25.6 MEQ/L (ref 21–32)
HCT VFR BLD CALC: 38 % (ref 39–51)
MCH RBC QN AUTO: 29.4 PG (ref 27–34)
MCHC RBC AUTO-ENTMCNC: 33.3 % (ref 32–36)
MCV RBC AUTO: 88.2 FL (ref 80–100)
PLATELET # BLD: 302 TH/MM3 (ref 150–450)
POTASSIUM SERPL-SCNC: 3.7 MEQ/L (ref 3.5–5.1)
RBC # BLD AUTO: 4.3 MIL/MM3 (ref 4.5–5.9)
REVIEW FLAG: (no result)
SODIUM SERPL-SCNC: 139 MEQ/L (ref 136–145)
WBC # BLD AUTO: 11.7 TH/MM3 (ref 4–11)

## 2017-04-01 RX ADMIN — STANDARDIZED SENNA CONCENTRATE AND DOCUSATE SODIUM SCH TAB: 8.6; 5 TABLET, FILM COATED ORAL at 09:00

## 2017-04-01 RX ADMIN — HUMAN INSULIN SCH: 100 INJECTION, SOLUTION SUBCUTANEOUS at 04:59

## 2017-04-01 RX ADMIN — IPRATROPIUM BROMIDE AND ALBUTEROL SULFATE SCH AMPULE: .5; 3 SOLUTION RESPIRATORY (INHALATION) at 04:23

## 2017-04-01 RX ADMIN — HUMAN INSULIN SCH: 100 INJECTION, SOLUTION SUBCUTANEOUS at 11:00

## 2017-04-01 RX ADMIN — WATER SCH ML: 1 IRRIGANT IRRIGATION at 09:00

## 2017-04-01 RX ADMIN — FAMOTIDINE SCH MG: 20 TABLET, FILM COATED ORAL at 09:47

## 2017-04-01 RX ADMIN — IPRATROPIUM BROMIDE AND ALBUTEROL SULFATE SCH AMPULE: .5; 3 SOLUTION RESPIRATORY (INHALATION) at 15:08

## 2017-04-01 RX ADMIN — IPRATROPIUM BROMIDE AND ALBUTEROL SULFATE SCH AMPULE: .5; 3 SOLUTION RESPIRATORY (INHALATION) at 00:21

## 2017-04-01 RX ADMIN — IPRATROPIUM BROMIDE AND ALBUTEROL SULFATE SCH AMPULE: .5; 3 SOLUTION RESPIRATORY (INHALATION) at 07:37

## 2017-04-01 RX ADMIN — Medication SCH ML: at 09:00

## 2017-04-01 RX ADMIN — APIXABAN SCH MG: 5 TABLET, FILM COATED ORAL at 09:47

## 2017-04-01 RX ADMIN — POLYETHYLENE GLYCOL 3350 SCH GM: 17 POWDER, FOR SOLUTION ORAL at 09:00

## 2017-04-01 RX ADMIN — BISACODYL SCH MG: 10 SUPPOSITORY RECTAL at 09:00

## 2017-04-01 RX ADMIN — CHLORHEXIDINE GLUCONATE SCH PACK: 500 CLOTH TOPICAL at 03:35

## 2017-04-01 RX ADMIN — DOCUSATE SODIUM SCH MG: 100 CAPSULE, LIQUID FILLED ORAL at 09:00

## 2017-04-01 RX ADMIN — IPRATROPIUM BROMIDE AND ALBUTEROL SULFATE SCH AMPULE: .5; 3 SOLUTION RESPIRATORY (INHALATION) at 11:45

## 2017-04-01 NOTE — PD.ONC.PN
Subjective


Subjective


Remarks


Afebrile overnight.  Patient tolerating Eliquis. Not requiring O2 anymore. Able 

to walk a few steps.





Objective


Data











  Date Time  Temp Pulse Resp B/P Pulse Ox O2 Delivery O2 Flow Rate FiO2


 


4/1/17 12:25 98.2 89 20 158/73 92   


 


4/1/17 08:04 98.1 80 21 135/61 94   


 


4/1/17 07:49     92   21


 


4/1/17 05:25 97.8 80 19 135/60 96   


 


4/1/17 00:41 97.8 81 17 125/63 93   


 


3/31/17 21:44 98.1 90 20 157/67 94   


 


3/31/17 20:00  90      


 


3/31/17 19:27     94 Nasal Cannula 2.00 


 


3/31/17 15:05     96 Nasal Cannula 2.00 


 


3/31/17 13:40 98.4 92 20 145/66 95   








Result Diagram:  


4/1/17 0416                                                                    

            4/1/17 0416





Laboratory Results





Laboratory Tests








Test 4/1/17





 04:16


 


White Blood Count 11.7 TH/MM3


 


Red Blood Count 4.30 MIL/MM3


 


Hemoglobin 12.6 GM/DL


 


Hematocrit 38.0 %


 


Mean Corpuscular Volume 88.2 FL


 


Mean Corpuscular Hemoglobin 29.4 PG


 


Mean Corpuscular Hemoglobin 33.3 %





Concent 


 


Red Cell Distribution Width 14.5 %


 


Platelet Count 302 TH/MM3


 


Mean Platelet Volume 7.7 FL


 


Sodium Level 139 MEQ/L


 


Potassium Level 3.7 MEQ/L


 


Chloride Level 106 MEQ/L


 


Carbon Dioxide Level 25.6 MEQ/L


 


Anion Gap 7 MEQ/L


 


Blood Urea Nitrogen 16 MG/DL


 


Creatinine 0.80 MG/DL


 


Estimat Glomerular Filtration 100 ML/MIN





Rate 


 


Random Glucose 96 MG/DL


 


Calcium Level 8.5 MG/DL











Administered Medications








 Medications


  (Trade)  Dose


 Ordered  Sig/Madie


 Route


 PRN Reason  Start Time


 Stop Time Status Last Admin


Dose Admin


 


 Chlorhexidine


 Gluconate


  (Chlorhexidine


 2% Cloth)  3 pack


 Taper  DAILY@04


 TOP


   3/28/17 04:00


 3/24/18 03:59  3/31/17 04:00


 


 


 Sodium Chloride


  (NS Flush)    DAILY


 IVF


   3/28/17 09:00


    3/30/17 09:00


 


 


 Sodium Chloride


  (NS Flush)    UNSCH  PRN


 IVF


 SEE PROTOCOL  3/27/17 17:30


    3/31/17 09:09


 


 


 Docusate Sodium


  (Colace)  100 mg  BID


 PO


   3/29/17 09:00


    3/31/17 09:08


 


 


 Apixaban


  (Eliquis)  10 mg  BID


 PO


   3/30/17 09:00


 4/5/17 21:01  4/1/17 09:47


 


 


 Famotidine


  (Pepcid)  20 mg  BID


 PO


   3/30/17 21:00


    4/1/17 09:47


 


 


 Acetaminophen


  (Tylenol)  500 mg  Q6H  PRN


 PO


 pain 1-10 or fever  3/30/17 11:15


    3/30/17 11:34


 


 


 Oxycodone HCl


  (Roxicodone)  5 mg  Q6H  PRN


 PO


 pain 1-7  3/30/17 11:15


    4/1/17 04:57


 


 


 Hydromorphone HCl


  (Dilaudid Pf Inj)  0.5 mg  Q4H  PRN


 IV PUSH


 pain 8-10 or not taking po  3/30/17 11:15


    3/30/17 18:23


 


 


 Polyethylene


 Glycol


  (Miralax)  17 gm  BID


 PO


   3/30/17 11:15


    3/30/17 12:56


 


 


 Lactulose


  (Lactulose Liq)  30 ml  BID


 PO


   3/30/17 21:00


    3/31/17 21:44


 


 


 Senna/Docusate


 Sodium


  (Carlota-Colace)  1 tab  BID


 PO


   3/30/17 11:15


    3/30/17 12:56


 








Objective Remarks


GENERAL: Pleasant obese male, sitting up in chair next to bed in nad. 


SKIN: Warm and dry.


HEAD: Normocephalic.


EYES: No injection or drainage. 


NECK: Supple, trachea midline. 


CARDIOVASCULAR: Regular rate and rhythm


RESPIRATORY: Breath sounds equal bilaterally. No accessory muscle use.


GASTROINTESTINAL: Abdomen soft, non-tender, nondistended. 


EXTREMITIES: No cyanosis


NEUROLOGICAL: awake and alert, normal speech. moving extremities.





Assessment/Plan


Problem List:  


(1) Pulmonary embolus


Status:  Acute


Plan:  4/1/17: continue Eliquis. clear for discharge from hematology 

perspective.


3/30/17: Heparin D/C'd. Started on Eliquis. He will remain on 10mg BID x 7 days 

then he will be decreased to 5mg BID. We are planning to keep him on this 

medication for one year. 


3/29/17: Doing better. Continue heparin gtt. Monitor for bleeding. Will start 

him on Eliquis once stable.


3/28/17: follow protocol for heparin gtt per invasive radiology s/p TPA. will 

start oral anticoagulation once more stable on medical floor.


leg U/S : DVT, left popliteal vein


--s/p TPA infusion


--will need to be on oral anticoagulation for at least a year





Assessment


55y/o male with massive bilateral pulmonary emboli


history of morbid obesity and hard of hearing.  


+ [facility] of DVT


Plan


1. continue Eliquis


2. hematology clear for discharge


Attending Statement


The exam, history, and the medical decision-making described in the above note 

were completed with the assistance of the mid-level provider. I reviewed and 

agree with the findings presented.  I attest that I had a face-to-face 

encounter with the patient on the same day, and personally performed and 

documented my assessment and findings in the medical record.  No CP.  LOCO 

improving.  They have some questions regarding treatment of PE which were 

answered. Continue Eliquis.





Problem Qualifiers





(1) Pulmonary embolus:  


Qualified Code:  I26.09 - Other acute pulmonary embolism with acute cor 

pulmonale





Ioana Lopez Apr 1, 2017 12:39


Cruz Frey MD Apr 1, 2017 12:44

## 2017-04-01 NOTE — HHI.DS
Discharge Summary


Admission Date


Mar 27, 2017 at 15:29


Discharge Date:  Apr 1, 2017


Admitting Diagnosis


pulmonary embolism





(1) Pulmonary embolus


(2) Morbid obesity


Diagnosis:  Principal





Consultants


Dr. Mike Roa, Hematology


CBC/BMP:  


4/1/17 0416                                                                    

            4/1/17 0416





Significant Findings





Laboratory Tests








Test 3/29/17 3/29/17 3/30/17 3/31/17





 16:00 23:15 05:15 04:32


 


Activated Partial 36.9 SEC 36.2 SEC 36.5 SEC 





Thromboplast Time (24.3-30.1) (24.3-30.1) (24.3-30.1) 


 


White Blood Count   13.5 TH/MM3 12.6 TH/MM3





   (4.0-11.0) (4.0-11.0)


 


Red Blood Count   4.16 MIL/MM3 4.47 MIL/MM3





   (4.50-5.90) (4.50-5.90)


 


Hemoglobin   11.7 GM/DL 





   (13.0-17.0) 


 


Hematocrit   37.0 % 





   (39.0-51.0) 


 


Mean Corpuscular Hemoglobin   31.7 % 





Concent   (32.0-36.0) 


 


Prothrombin Time   12.6 SEC 





   (9.8-11.6) 


 


Calcium Level   8.0 MG/DL 8.3 MG/DL





   (8.5-10.1) (8.5-10.1)


 


Estimat Glomerular Filtration    88 ML/MIN (>89)





Rate    


 


    





Test 4/1/17   





 04:16   


 


White Blood Count 11.7 TH/MM3   





 (4.0-11.0)   


 


Red Blood Count 4.30 MIL/MM3   





 (4.50-5.90)   


 


Hemoglobin 12.6 GM/DL   





 (13.0-17.0)   


 


Hematocrit 38.0 %   





 (39.0-51.0)   








PE at Discharge


General: morbidly obese male in NAD, AAOx3


Chest: CTA anteriorly


Cardiac: Regular


Abd: +BS, soft, obese, nontender


Ext: No pitting edema


Hospital Course


(1) Pulmonary embolus


Status:  Acute


Plan:  


- Patient is a 55 y/o morbidly obese male who presented to the ED with a one 

week history of left-sided chest pain that radiated into his flank 


 and SOB. The pt had been diagnosed with pneumonia by his PCP and had been 

started on Levaquin which he had taken for 5 days prior to admission. 


- On arrival to the ER he was tachycardiac and hypoxemic with O2 saturation of 

85% and was placed on 5 liters via NC with improvement


- CXR in the ED showed cardiomegaly with subsegmental atelectasis in the left 

lung base and small left pleural effusion.  


- CTA of the chest was obtained which showed massive bilateral PE, right 

greater than left with subpleural atelectatic changes in the left lower lobe. 


- In the ED the patient was given heparin bolus and just started on heparin 

drip and was admitted to [facility]. 


- IR was consulted and the patient was given t-PA infusion at 2 mg an hour and 

heparin drip 500 units per hour. 


- The tPA was completed at 5 PM on 3/28.  


- Heparin was D/C'd on 3/30


- Hematology was consulted and he was transitioned to Eliquis on 3/30.


- He will remain on Eliquis 10mg BID x 7 days (until 4/5/17) then he will be 

decreased to 5mg BID (on 4/6/17).


- Pt failed walk test 3/31/17


- Pt successfully weaned off oxygen 4/1


- Pt ambulated with PT


- will discharge to home with [facility]


- will need to continue eliquis x 1 year


- f/u with PCP in 1 week, Dr. Lalito Cast





(2) Morbid obesity


Status:  Chronic


Assessment and Plan


Pt Condition on Discharge:  Stable


Discharge Disposition:  Disch w/ Home Health Serv


Discharge Instructions


DIET: Follow Instructions for:  As Tolerated, No Restrictions


Activities you can perform:  Regular-No Restrictions


Follow up Referrals:  


Oncology - 2 Weeks with Dr. Mike Roa


PCP Follow-up - 1 Week with Dr. Lalito Cast





New Medications:  


Apixaban (Eliquis) 5 Mg Tab


10 MG PO AS DIRECTED 10mg (2 tablets) twice daily until 4/5/17 then he will be 

decreased to 5mg (one tablet) twice daily. PE Days 30 TAB


 


Continued Medications:  


Naltrexone-Bupropion (Contrave) 8-90 Mg Tab


2 TAB PO BID Weight Management #120 Ref 0 TAB


 


Discontinued Medications:  


Levofloxacin (Levaquin) 750 Mg Tab


750 MG PO DAILY Infection Days 5 Ref 0 TAB











Erik Duggan DO Apr 1, 2017 14:44

## 2018-05-14 ENCOUNTER — HOSPITAL ENCOUNTER (EMERGENCY)
Dept: HOSPITAL 17 - NEPE | Age: 58
Discharge: HOME | End: 2018-05-14
Payer: COMMERCIAL

## 2018-05-14 VITALS — WEIGHT: 315 LBS | BODY MASS INDEX: 41.75 KG/M2 | HEIGHT: 73 IN

## 2018-05-14 VITALS
TEMPERATURE: 97.4 F | RESPIRATION RATE: 18 BRPM | HEART RATE: 67 BPM | OXYGEN SATURATION: 99 % | DIASTOLIC BLOOD PRESSURE: 73 MMHG | SYSTOLIC BLOOD PRESSURE: 152 MMHG

## 2018-05-14 DIAGNOSIS — M79.605: Primary | ICD-10-CM

## 2018-05-14 DIAGNOSIS — Z79.01: ICD-10-CM

## 2018-05-14 PROCEDURE — 93971 EXTREMITY STUDY: CPT

## 2018-05-14 PROCEDURE — 99284 EMERGENCY DEPT VISIT MOD MDM: CPT

## 2018-05-14 NOTE — PD
HPI


Chief Complaint:  Pain: Acute or Chronic


Time Seen by Provider:  13:24


Travel History


International Travel<30 days:  No


Contact w/Intl Traveler<30days:  No


Traveled to known affect area:  No





History of Present Illness


HPI


57-year-old male with history of PE, on Eliquis, presents for evaluation of 

left leg pain.  Symptoms started 4-5 days ago.  He describes it as a stiffness 

type of pain in the posterior left knee region which comes and goes.  Denies 

any injury.  Denies any pain in the left calf or the left thigh.  Denies any 

numbness or tingling.  Because of his history of PE he wanted to make sure that 

he has not developed any DVT.  Denies any recent travel or recent surgery.  

Denies any chest pain or shortness of breath.  He has no other complaints at 

this time.





PFSH


Past Medical History


Hx Anticoagulant Therapy:  Yes (eliquis )


Arthritis:  Yes (right hip.)


Anxiety:  No


Depression:  Yes


Cancer:  No


Cardiovascular Problems:  No (Not until today. )


Diminished Hearing:  Yes (bilat )


Endocrine:  No


Genitourinary:  Yes


Immune Disorder:  No


Musculoskeletal:  Yes


Neurologic:  No


Psychiatric:  No


Respiratory:  Yes (clots )





Past Surgical History


Other Surgery:  No





Social History


Alcohol Use:  No


Tobacco Use:  No


Substance Use:  No





Allergies-Medications


(Allergen,Severity, Reaction):  


Coded Allergies:  


     No Known Allergies (Unverified , 3/27/17)


Reported Meds & Prescriptions





Reported Meds & Active Scripts


Active


Eliquis (Apixaban) 5 Mg Tab 10 Mg PO AS DIRECTED 30 Days


     10mg (2 tablets) twice daily until 4/5/17 then he will be decreased


     to 5mg (one tablet) twice daily.


Reported


[weight manage-t ]    PO DAILY


Vitamin D3 (Cholecalciferol) 1,000 Unit Tab 1,000 Units PO DAILY


[antidepressant ]    PO BID








Review of Systems


Except as stated in HPI:  all other systems reviewed are Neg





Physical Exam


Narrative


GENERAL: Well-developed well-nourished male in no acute distress


SKIN: Warm and dry.


HEAD: Atraumatic. Normocephalic. 


EYES: Pupils equal and round. No scleral icterus. No injection or drainage. 


ENT: No nasal bleeding or discharge.  Mucous membranes pink and moist.


NECK: Trachea midline. No JVD. 


CARDIOVASCULAR: Regular rate and rhythm.  No murmur appreciated.


RESPIRATORY: No accessory muscle use. Clear to auscultation. Breath sounds 

equal bilaterally. 


GASTROINTESTINAL: Abdomen soft, non-tender, nondistended. Hepatic and splenic 

margins not palpable. 


MUSCULOSKELETAL: No obvious deformities.  There is no lower extremity edema, 

negative Homans.  There is no knee effusion.  There is no tenderness to 

palpation the left calf, posterior knee or thigh.  There is some pain to the 

posterior left knee with flexion of the left leg against resistance.


NEUROLOGICAL: Awake and alert. No obvious cranial nerve deficits.  Motor 

grossly within normal limits. Normal speech.





Data


Data


Last Documented VS





Vital Signs








  Date Time  Temp Pulse Resp B/P (MAP) Pulse Ox O2 Delivery O2 Flow Rate FiO2


 


5/14/18 12:33 97.4 67 18 152/73 (99) 99   








Orders





 Orders


Us Leg Venous Doppler (5/14/18 13:31)


Ed Discharge Order (5/14/18 14:55)








[facility]


Medical Decision Making


Medical Screen Exam Complete:  Yes


Emergency Medical Condition:  Yes


Medical Record Reviewed:  Yes


Differential Diagnosis


Cyst versus muscle strain versus tendinitis versus DVT


Narrative Course


Physical examination is reassuring.  Plan is for ultrasound of the left lower 

extremity.


Ultrasound of left leg


CONCLUSION:       Negative for deep venous thrombosis.  





Pain is likely musculoskeletal, reproduced with flexion against resistance.  

Recommended conservative therapy.  The patient is stable for discharge.





Diagnosis





 Primary Impression:  


 Left leg pain





***Additional Instructions:  


Take Tylenol as needed for pain predosing instructions on the bottle.  Avoid 

strenuous activity or heavy lifting.  Follow-up with primary care physician in 1

-2 weeks.  Return for any emergent medical conditions.


***Med/Other Pt SpecificInfo:  No Change to Meds


Disposition:  01 DISCHARGE HOME


Condition:  Stable











Garrett Decker May 14, 2018 13:39

## 2018-05-14 NOTE — PD
Physical Exam


Narrative


Please see mid level provider note for full history, physical, and disposition. 

Briefly, patient presented c/o L leg pain (posterior knee). He's on anti-

coagulation for PE.He was afebrile, slightly HTNive, remaining vitals were 

normal. U/S negative for DVT. Advised to take pain meds and f/u.





Data


Data


Last Documented VS





Vital Signs








  Date Time  Temp Pulse Resp B/P (MAP) Pulse Ox O2 Delivery O2 Flow Rate FiO2


 


5/14/18 12:33 97.4 67 18 152/73 (99) 99   








Orders





 Orders


Us Leg Venous Doppler (5/14/18 13:31)


Ed Discharge Order (5/14/18 14:55)








MDM


Supervised Visit with HALINA:  Yes


Diagnosis





 Primary Impression:  


 Left leg pain


Patient Instructions:  General Instructions, Leg Pain (ED)


Departure Forms:  Tests/Procedures





***Additional Instruction:  


Take Tylenol as needed for pain predosing instructions on the bottle.  Avoid 

strenuous activity or heavy lifting.  Follow-up with primary care physician in 1

-2 weeks.  Return for any emergent medical conditions.


Disposition:  01 DISCHARGE HOME


Condition:  Stable











Marissa Delong MD May 14, 2018 21:54

## 2018-05-14 NOTE — RADRPT
EXAM DATE/TIME:  05/14/2018 14:13 

 

[facility] COMPARISON:     

No previous studies available for comparison.

        

 

 

INDICATIONS :                

Left knee pain. 

            

 

MEDICAL HISTORY :     

Arthritis.      Anticoagulant therapy. Pulmonary embolism. Enlarged prostate. Depression. Anticoagula
nt therapy, Eliquis. 

 

SURGICAL HISTORY :     

None.    

 

ENCOUNTER:     

Subsequent

 

ACUITY:     

3 days

 

PAIN SCORE:      

0/10

 

LOCATION:      

Left  leg.

            

                      

 

TECHNIQUE:     

Venous ultrasound of the leg was performed from the inguinal ligament to the proximal calf.  Real-pauline
e, color Doppler and spectral tracing, compression and augmentation techniques were used.  

 

FINDINGS:     

There is normal compressibility of the deep venous system from the inguinal region to the proximal ca
lf.  No echogenic clot is seen in the lumen of the common femoral, femoral, popliteal, and posterior 
tibial veins.  There is a normal response of the venous system to proximal and distal augmentation an
d respiration.  

 

CONCLUSION:       Negative for deep venous thrombosis.  

 

 

 Ever Leal MD FACR on May 14, 2018 at 14:38           

Board Certified Radiologist.

 This report was verified electronically.